# Patient Record
Sex: FEMALE | Race: OTHER | HISPANIC OR LATINO
[De-identification: names, ages, dates, MRNs, and addresses within clinical notes are randomized per-mention and may not be internally consistent; named-entity substitution may affect disease eponyms.]

---

## 2017-12-22 PROBLEM — Z00.00 ENCOUNTER FOR PREVENTIVE HEALTH EXAMINATION: Status: ACTIVE | Noted: 2017-12-22

## 2018-01-11 ENCOUNTER — APPOINTMENT (OUTPATIENT)
Dept: BREAST CENTER | Facility: CLINIC | Age: 28
End: 2018-01-11
Payer: COMMERCIAL

## 2018-01-16 ENCOUNTER — APPOINTMENT (OUTPATIENT)
Dept: BREAST CENTER | Facility: CLINIC | Age: 28
End: 2018-01-16
Payer: COMMERCIAL

## 2018-01-16 VITALS
WEIGHT: 214 LBS | BODY MASS INDEX: 35.65 KG/M2 | SYSTOLIC BLOOD PRESSURE: 116 MMHG | DIASTOLIC BLOOD PRESSURE: 80 MMHG | HEIGHT: 65 IN

## 2018-01-16 DIAGNOSIS — Z78.9 OTHER SPECIFIED HEALTH STATUS: ICD-10-CM

## 2018-01-16 PROCEDURE — 99212 OFFICE O/P EST SF 10 MIN: CPT

## 2019-01-15 NOTE — HISTORY OF PRESENT ILLNESS
[FreeTextEntry1] : Patient with h/o Left nipple bx 8/26/09; psoriasiform changes of epidermis.\par h/o Right papilloma with sclerosis on NC 12/14/11; 9:00 Retroareolar.\par s/p Right NLOC 1/30/12 - intraductal papilloma, FC changes, ductal hyperplasia, apocrine metaplasia, adenosis and stromal fibrosis.\par Family history of breast cancer - mother 44 and paternal grandmother.  Mother is BRCA (-).

## 2019-01-15 NOTE — PAST MEDICAL HISTORY
[Menstruating] : The patient is menstruating [Menarche Age ____] : age at menarche was [unfilled] [Regular Cycle Intervals] : have been regular [FreeTextEntry5] : ovarian cystectomy with torsion and cysectomy with appendectomy.  [FreeTextEntry2] : no pregnancies.   [FreeTextEntry6] : none [FreeTextEntry7] : OCP 15 to 25 years old. [FreeTextEntry8] : none

## 2019-01-22 ENCOUNTER — APPOINTMENT (OUTPATIENT)
Dept: BREAST CENTER | Facility: CLINIC | Age: 29
End: 2019-01-22

## 2021-04-27 PROBLEM — Z80.3 FAMILY HISTORY OF MALIGNANT NEOPLASM OF BREAST: Status: ACTIVE | Noted: 2018-01-16

## 2021-04-27 PROBLEM — Z78.9 USES ALCOHOL OCCASIONALLY: Status: ACTIVE | Noted: 2021-04-27

## 2021-04-27 PROBLEM — Z87.09 HISTORY OF ASTHMA: Status: RESOLVED | Noted: 2021-04-27 | Resolved: 2021-04-27

## 2021-04-27 PROBLEM — Z86.2 HISTORY OF ANEMIA: Status: RESOLVED | Noted: 2021-04-27 | Resolved: 2021-04-27

## 2021-04-27 PROBLEM — Z87.891 FORMER SMOKER: Status: ACTIVE | Noted: 2018-01-16

## 2021-04-28 ENCOUNTER — APPOINTMENT (OUTPATIENT)
Dept: BREAST CENTER | Facility: CLINIC | Age: 31
End: 2021-04-28
Payer: COMMERCIAL

## 2021-04-28 VITALS
HEIGHT: 65 IN | DIASTOLIC BLOOD PRESSURE: 84 MMHG | SYSTOLIC BLOOD PRESSURE: 122 MMHG | TEMPERATURE: 98.5 F | BODY MASS INDEX: 31.32 KG/M2 | WEIGHT: 188 LBS

## 2021-04-28 DIAGNOSIS — Z78.9 OTHER SPECIFIED HEALTH STATUS: ICD-10-CM

## 2021-04-28 DIAGNOSIS — L30.9 DERMATITIS, UNSPECIFIED: ICD-10-CM

## 2021-04-28 DIAGNOSIS — Z87.891 PERSONAL HISTORY OF NICOTINE DEPENDENCE: ICD-10-CM

## 2021-04-28 DIAGNOSIS — Z87.09 PERSONAL HISTORY OF OTHER DISEASES OF THE RESPIRATORY SYSTEM: ICD-10-CM

## 2021-04-28 DIAGNOSIS — Z80.3 FAMILY HISTORY OF MALIGNANT NEOPLASM OF BREAST: ICD-10-CM

## 2021-04-28 DIAGNOSIS — Z86.2 PERSONAL HISTORY OF DISEASES OF THE BLOOD AND BLOOD-FORMING ORGANS AND CERTAIN DISORDERS INVOLVING THE IMMUNE MECHANISM: ICD-10-CM

## 2021-04-28 PROCEDURE — 99072 ADDL SUPL MATRL&STAF TM PHE: CPT

## 2021-04-28 PROCEDURE — 99203 OFFICE O/P NEW LOW 30 MIN: CPT

## 2021-04-28 NOTE — REVIEW OF SYSTEMS
[Negative] : Heme/Lymph [As Noted in HPI] : as noted in HPI [Skin Lesions] : skin lesion [Skin Wound] : no skin wound [Itching] : no itching [Change In A Mole] : no change in a mole [Breast Pain] : no breast pain [Breast Lump] : no breast lump

## 2021-04-28 NOTE — HISTORY OF PRESENT ILLNESS
[FreeTextEntry1] : Kimberly is a 30 F with benign high risk disease, and a history of R lumpectomy for an intraductal papilloma. \par \par She has not had any recent imaging. \par \par She underwent a R lumpectomy on 1/30/2012 for an intraductal papilloma. \par She has no breast related complaints at this time.  She denies any breast pain, has not palpated any new palpable masses in either breast and denies any nipple discharge or retraction. \par \par However, she does have a history of R nipple dermatitis which was previously treated with creams with little relief.  She did see a dermatologist also who prescribed her with some creams, however she reports that it does not completely resolve.  \par \par HISTORICAL RISK FACTORS: \par -prior breast biopsies/surgeries as above\par -family history of breast cancer in her mother at age 45, paternal grandmother in her 60s\par -G0\par -OCP use currently, used intermittently for the past 15 years for PCOS \par -no gyn surgeries\par \par RISK ASSESSMENT: \par Al \par 5yr -- 0.5%\par lifetime -- 13.5%\par \par TC v6\par 5yr -- 0.5%\par lifetime -- 30.3%

## 2021-04-28 NOTE — ASSESSMENT
[FreeTextEntry1] : Kimberly is a 30 F with benign high risk disease 2/2 a family history and a personal history of R lumpectomy for an intraductal papilloma. \par \par ON exam, I was able to palpate nonspecific nodularities in her left superior breast which feels like scar tissue, but otherwise no other suspicious lesions were palpated in either breast. \par \par She has not had any recent imaging. \par \par In regards to her family history of breast cancer in her mother and paternal grandmother, her risk assessment is as follows: \par RISK ASSESSMENT: \par Al \par 5yr -- 0.5%\par lifetime -- 13.5%\par \par TC v6\par 5yr -- 0.5%\par lifetime -- 30.3%\par \par This puts her in the high risk category for breast cancer because she has a lifetime risk of >20%.  She qualifies for annual screening MRIs which would be done in an alternating fashion with her screening mammograms such that an imaging study and clinical breast exam would be performed every 6 months.  The use of MRIs have not been shown to prolong survival, however out of 1000 women screened, an additional 14-15 cancers will be identified.  The use of MRIs, has, however, been shown to increase the number of procedures and additional imaging because although it is a very sensitive test, it is not as specific.  Because her mother was diagnosed at age 45, I recommend starting screening breast MRIs at the age of 35. \par \par SHe should also start annual screening mammograms at the age of 36. \par \par Her 5yr risk does not exceed 1.7% so she does not quite meet criteria for risk reducing medications at this time. \par \par All of her questions were answered.  She knows to call with any further questions or concerns. \par \par PLAN: \par -f/up in 6 months for re-evaluation of nipple dermatitis \par -hydrocortisone cream/ointment prescribed

## 2021-04-28 NOTE — PAST MEDICAL HISTORY
[Menstruating] : The patient is menstruating [Menarche Age ____] : age at menarche was [unfilled] [Definite ___ (Date)] : the last menstrual period was [unfilled] [Total Preg ___] : G[unfilled] [History of Hormone Replacement Treatment] : has no history of hormone replacement treatment [FreeTextEntry5] : denies  [FreeTextEntry6] : denies [FreeTextEntry7] : ?  [FreeTextEntry8] : n/a

## 2021-04-28 NOTE — PHYSICAL EXAM
[Normocephalic] : normocephalic [Atraumatic] : atraumatic [EOMI] : extra ocular movement intact [No Supraclavicular Adenopathy] : no supraclavicular adenopathy [No Cervical Adenopathy] : no cervical adenopathy [Examined in the supine and seated position] : examined in the supine and seated position [Symmetrical] : symmetrical [No dominant masses] : no dominant masses in right breast  [No dominant masses] : no dominant masses left breast [No Nipple Retraction] : no left nipple retraction [No Nipple Discharge] : no left nipple discharge [No Axillary Lymphadenopathy] : no left axillary lymphadenopathy [Soft] : abdomen soft [Not Tender] : non-tender [No Edema] : no edema [No Rashes] : no rashes [No Ulceration] : no ulceration [de-identified] : surgical incisions are healing well , no suspicious abnormalities were palpated within either breast  [de-identified] : no evidence of dermatitis at this time  [de-identified] : nodularity palpated @12:00N1, feels like post surgical changes from her b/l mammaplasty

## 2021-07-06 ENCOUNTER — APPOINTMENT (OUTPATIENT)
Dept: BREAST CENTER | Facility: CLINIC | Age: 31
End: 2021-07-06
Payer: COMMERCIAL

## 2021-07-06 DIAGNOSIS — N63.10 UNSPECIFIED LUMP IN THE RIGHT BREAST, UNSPECIFIED QUADRANT: ICD-10-CM

## 2021-07-06 PROCEDURE — 99072 ADDL SUPL MATRL&STAF TM PHE: CPT

## 2021-07-06 PROCEDURE — 99213 OFFICE O/P EST LOW 20 MIN: CPT

## 2021-07-08 NOTE — REVIEW OF SYSTEMS
[As Noted in HPI] : as noted in HPI [Negative] : Heme/Lymph [Breast Lump] : breast lump [Skin Lesions] : no skin lesions [Skin Wound] : no skin wound [Itching] : no itching [Change In A Mole] : no change in a mole [Breast Pain] : no breast pain

## 2021-07-08 NOTE — HISTORY OF PRESENT ILLNESS
[FreeTextEntry1] : Kimberly is a 30 F with benign high risk disease, and a history of R lumpectomy for an intraductal papilloma. \par \par She has not had any recent imaging. \par \par She underwent a R lumpectomy on 1/30/2012 for an intraductal papilloma. \par She has no breast related complaints at this time.  She denies any breast pain, has not palpated any new palpable masses in either breast and denies any nipple discharge or retraction. \par \par However, she does have a history of R nipple dermatitis which was previously treated with creams with little relief.  She did see a dermatologist also who prescribed her with some creams, however she reports that it does not completely resolve.  \par \par HISTORICAL RISK FACTORS: \par -prior breast biopsies/surgeries as above\par -family history of breast cancer in her mother at age 45, paternal grandmother in her 60s\par -G0\par -OCP use currently, used intermittently for the past 15 years for PCOS \par -no gyn surgeries\par \par RISK ASSESSMENT: \par Al \par 5yr -- 0.5%\par lifetime -- 13.5%\par \par TC v6\par 5yr -- 0.5%\par lifetime -- 30.3%\par \par INTERVAL HISTORY: \par Kimberly returns for a short term follow up because she felt a new right breast mass.  \par \par Starting about 1 week prior, she noticed a right breast mass in the inferior portion of her breast.  IT does not cause her pain, and has not changed in size or appearance since she first felt it.  She denies any other breast related complaints and has not had any issues with dermatitis since her last visit. \par \par She has not had any recent imaging.

## 2021-07-08 NOTE — PHYSICAL EXAM
[Normocephalic] : normocephalic [Atraumatic] : atraumatic [EOMI] : extra ocular movement intact [No Supraclavicular Adenopathy] : no supraclavicular adenopathy [No Cervical Adenopathy] : no cervical adenopathy [Examined in the supine and seated position] : examined in the supine and seated position [No dominant masses] : no dominant masses in right breast  [Symmetrical] : symmetrical [No dominant masses] : no dominant masses left breast [No Nipple Retraction] : no left nipple retraction [No Nipple Discharge] : no left nipple discharge [No Axillary Lymphadenopathy] : no left axillary lymphadenopathy [Soft] : abdomen soft [Not Tender] : non-tender [No Edema] : no edema [No Rashes] : no rashes [No Ulceration] : no ulceration [de-identified] : surgical incisions are well healed, no suspicious abnormalities were palpated within her left breast  [de-identified] : no evidence of dermatitis at this time, just to the left of her inframammary fold in the center, she has a 1 cm nonmobile nodule without any overlying skin changes  [de-identified] : nodularity palpated @12:00N1, feels like post surgical changes from her b/l mammaplasty

## 2021-07-08 NOTE — ASSESSMENT
[FreeTextEntry1] : Kimberly is a 30 F with benign high risk disease 2/2 a family history and a personal history of R lumpectomy for an intraductal papilloma, now with a palpable right breast mass. \par \par ON exam, I was able to palpate nonspecific nodularities in her left superior breast which feels like scar tissue.  In her right breast, no evidence of dermatitis at this time, just to the left of her inframammary fold in the center, she has a 1 cm nonmobile nodule without any overlying skin changes. \par \par In light of these findings, I will have her scheduled for a R breast US.  She should follow up after her repeat imaging. \par \par In regards to her family history of breast cancer in her mother and paternal grandmother, her risk assessment is as follows: \par RISK ASSESSMENT: \par Al \par 5yr -- 0.5%\par lifetime -- 13.5%\par \par TC v6\par 5yr -- 0.5%\par lifetime -- 30.3%\par \par This puts her in the high risk category for breast cancer because she has a lifetime risk of >20%.  She qualifies for annual screening MRIs which would be done in an alternating fashion with her screening mammograms such that an imaging study and clinical breast exam would be performed every 6 months.  The use of MRIs have not been shown to prolong survival, however out of 1000 women screened, an additional 14-15 cancers will be identified.  The use of MRIs, has, however, been shown to increase the number of procedures and additional imaging because although it is a very sensitive test, it is not as specific.  Because her mother was diagnosed at age 45, I recommend starting screening breast MRIs at the age of 35. \par \par SHe should also start annual screening mammograms at the age of 36. \par \par Her 5yr risk does not exceed 1.7% so she does not quite meet criteria for risk reducing medications at this time. \par \par All of her questions were answered.  She knows to call with any further questions or concerns. \par \par PLAN: \par -R breast US now \par -f/up after

## 2021-07-14 ENCOUNTER — RESULT REVIEW (OUTPATIENT)
Age: 31
End: 2021-07-14

## 2021-07-14 ENCOUNTER — OUTPATIENT (OUTPATIENT)
Dept: OUTPATIENT SERVICES | Facility: HOSPITAL | Age: 31
LOS: 1 days | Discharge: HOME | End: 2021-07-14
Payer: COMMERCIAL

## 2021-07-14 ENCOUNTER — NON-APPOINTMENT (OUTPATIENT)
Age: 31
End: 2021-07-14

## 2021-07-14 DIAGNOSIS — N63.10 UNSPECIFIED LUMP IN THE RIGHT BREAST, UNSPECIFIED QUADRANT: ICD-10-CM

## 2021-07-14 PROCEDURE — 76642 ULTRASOUND BREAST LIMITED: CPT | Mod: 26,RT

## 2021-07-29 ENCOUNTER — RESULT REVIEW (OUTPATIENT)
Age: 31
End: 2021-07-29

## 2021-07-29 ENCOUNTER — OUTPATIENT (OUTPATIENT)
Dept: OUTPATIENT SERVICES | Facility: HOSPITAL | Age: 31
LOS: 1 days | Discharge: HOME | End: 2021-07-29
Payer: COMMERCIAL

## 2021-07-29 PROCEDURE — 19083 BX BREAST 1ST LESION US IMAG: CPT | Mod: RT

## 2021-07-29 PROCEDURE — 88305 TISSUE EXAM BY PATHOLOGIST: CPT | Mod: 26

## 2021-07-30 LAB — SURGICAL PATHOLOGY STUDY: SIGNIFICANT CHANGE UP

## 2021-08-02 DIAGNOSIS — N63.10 UNSPECIFIED LUMP IN THE RIGHT BREAST, UNSPECIFIED QUADRANT: ICD-10-CM

## 2021-08-05 ENCOUNTER — NON-APPOINTMENT (OUTPATIENT)
Age: 31
End: 2021-08-05

## 2021-08-30 ENCOUNTER — APPOINTMENT (OUTPATIENT)
Dept: BREAST CENTER | Facility: CLINIC | Age: 31
End: 2021-08-30

## 2021-09-10 ENCOUNTER — TRANSCRIPTION ENCOUNTER (OUTPATIENT)
Age: 31
End: 2021-09-10

## 2021-10-11 NOTE — PAST MEDICAL HISTORY
[History of Hormone Replacement Treatment] : has no history of hormone replacement treatment [FreeTextEntry5] : denies  [FreeTextEntry6] : denies [FreeTextEntry7] : ?  [FreeTextEntry8] : n/a

## 2021-10-11 NOTE — DATA REVIEWED
[FreeTextEntry1] : EXAM:  MG US BREAST LIMITED RT\par \par \par PROCEDURE DATE:  07/14/2021\par \par \par \par INTERPRETATION:  PROCEDURE:  US BREAST LIMITED RIGHT\par \par INDICATION:  RIGHT BREAST PALPABLE LUMP.\par \par TECHNIQUE: Grayscale and color Doppler ultrasound of the right breast dated 7/14/2021 2:00 PM were compared with the previous dating back to 2011.\par \par FINDINGS:\par \par Right Breast:\par \par * Finding 1:  6:00 axis, 8 cm from the nipple, heterogeneous hypoechoic mass measuring 17 x 13 x 22 mm.\par \par * Other: No abnormal masses within the right breast.\par \par IMPRESSION:\par \par 1.  Heterogeneous hypoechoic mass at the 6:00 axis, 8 cm from the nipple. Recommend ultrasound-guided biopsy.\par \par BI-RADS Category 4: Suspicious\par \par --- End of Report ---\par RIGHT BREAST, CORE BIOPSY (FOR MASS):\par - BREAST TISSUE SHOWING FAT NECROSIS WITH ASSOCIATED REACTIVE\par INFLAMMATION.\par

## 2021-10-11 NOTE — HISTORY OF PRESENT ILLNESS
[FreeTextEntry1] : Kimberly is a 30 F with benign high risk disease, and a history of R lumpectomy for an intraductal papilloma. \par \par She has not had any recent imaging. \par \par She underwent a R lumpectomy on 1/30/2012 for an intraductal papilloma. \par She has no breast related complaints at this time.  She denies any breast pain, has not palpated any new palpable masses in either breast and denies any nipple discharge or retraction. \par \par However, she does have a history of R nipple dermatitis which was previously treated with creams with little relief.  She did see a dermatologist also who prescribed her with some creams, however she reports that it does not completely resolve.  \par \par HISTORICAL RISK FACTORS: \par -prior breast biopsies/surgeries as above\par -family history of breast cancer in her mother at age 45, paternal grandmother in her 60s\par -G0\par -OCP use currently, used intermittently for the past 15 years for PCOS \par -no gyn surgeries\par \par RISK ASSESSMENT: \par Al \par 5yr -- 0.5%\par lifetime -- 13.5%\par \par TC v6\par 5yr -- 0.5%\par lifetime -- 30.3%\par \par INTERVAL HISTORY: \par Kimberly returns for a short term follow up because she felt a new right breast mass.  \par \par Starting about 1 week prior, she noticed a right breast mass in the inferior portion of her breast.  IT does not cause her pain, and has not changed in size or appearance since she first felt it.  She denies any other breast related complaints and has not had any issues with dermatitis since her last visit. \par \par She has not had any recent imaging. \par \par \par INTERVAL HISTORY: 08/30/21\par Kimberly returns for a short term follow up\par \par Her work up was as follows: \par -07/14/2021- RIGHT U/S \par       -@6N8  heterogeneous hypoechoic mass measuring 17 x 13 x 22 mm.-->ultrasound-guided biopsy\par        Deemed BIRADS4\par -08/04/2021 Rt US guided bx (mini-cork)\par         -- BREAST TISSUE SHOWING FAT NECROSIS WITH ASSOCIATED REACTIVE\par               INFLAMMATION

## 2021-10-11 NOTE — ASSESSMENT
[FreeTextEntry1] : Kimberly is a 30 F with benign high risk disease 2/2 a family history and a personal history of R lumpectomy for an intraductal papilloma, now with a palpable right breast mass. \par \par ON exam, \par  \par \par In regards to her family history of breast cancer in her mother and paternal grandmother, her risk assessment is as follows: \par RISK ASSESSMENT: \par Al \par 5yr -- 0.5%\par lifetime -- 13.5%\par \par TC v6\par 5yr -- 0.5%\par lifetime -- 30.3%\par \par This puts her in the high risk category for breast cancer because she has a lifetime risk of >20%.  She qualifies for annual screening MRIs which would be done in an alternating fashion with her screening mammograms such that an imaging study and clinical breast exam would be performed every 6 months.  The use of MRIs have not been shown to prolong survival, however out of 1000 women screened, an additional 14-15 cancers will be identified.  The use of MRIs, has, however, been shown to increase the number of procedures and additional imaging because although it is a very sensitive test, it is not as specific.  Because her mother was diagnosed at age 45, I recommend starting screening breast MRIs at the age of 36. \par \par SHe should also start annual screening mammograms at the age of 36. \par \par Her 5yr risk does not exceed 1.7% so she does not quite meet criteria for risk reducing medications at this time. \par \par All of her questions were answered.  She knows to call with any further questions or concerns. \par \par PLAN: \par - \par -f/up after

## 2021-10-12 ENCOUNTER — APPOINTMENT (OUTPATIENT)
Dept: BREAST CENTER | Facility: CLINIC | Age: 31
End: 2021-10-12

## 2022-03-05 ENCOUNTER — EMERGENCY (EMERGENCY)
Facility: HOSPITAL | Age: 32
LOS: 0 days | Discharge: HOME | End: 2022-03-05
Attending: EMERGENCY MEDICINE | Admitting: EMERGENCY MEDICINE
Payer: COMMERCIAL

## 2022-03-05 VITALS
HEART RATE: 71 BPM | OXYGEN SATURATION: 100 % | SYSTOLIC BLOOD PRESSURE: 157 MMHG | RESPIRATION RATE: 17 BRPM | DIASTOLIC BLOOD PRESSURE: 99 MMHG | TEMPERATURE: 98 F

## 2022-03-05 DIAGNOSIS — S42.021A DISPLACED FRACTURE OF SHAFT OF RIGHT CLAVICLE, INITIAL ENCOUNTER FOR CLOSED FRACTURE: ICD-10-CM
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
## 2022-03-05 PROCEDURE — 99284 EMERGENCY DEPT VISIT MOD MDM: CPT

## 2022-03-05 NOTE — ED PROVIDER NOTE - CARE PROVIDER_API CALL
Dwight Beaver)  Orthopaedic Surgery  [address] [place] [address]  Phone: [phone]  Fax: [phone]  Follow Up Time: 1-3 Days

## 2022-03-05 NOTE — ED PROVIDER NOTE - PHYSICAL EXAMINATION
Physical Exam    Vital Signs: I have reviewed the initial vital signs.  Constitutional: well-nourished, appears stated age, no acute distress  Eyes: Conjunctiva pink, Sclera clear, PERRLA, EOMI without pain. :  Cardiovascular: S1 and S2, regular rate, regular rhythm, well-perfused extremities, radial pulses equal and 2+ b/l.   Respiratory: unlabored respiratory effort, clear to auscultation bilaterally no wheezing, rales and rhonchi. pt is speaking full sentences. no accessory muscle use. (+) no visible clavicular deformity. (+) mild tenderness to the distal clavicle. No flail chest.   Musculoskeletal: FROM of b/l upper and lower extremities however pain with moving the right upper extremity.   Integumentary: warm, dry, no rash  Neurologic: awake, alert, cranial nerves II-XII grossly intact, extremities’ motor and sensory functions grossly intact. median, radial, and ulnar nerve motor and sensory function grossly intact b/l. sensation over the deltoids intact b/l. steady gait.   Psychiatric: appropriate mood, appropriate affect

## 2022-03-05 NOTE — ED PROVIDER NOTE - NSFOLLOWUPINSTRUCTIONS_ED_ALL_ED_FT
Clavicle Fracture    A clavicle fracture is a break in the long bone that connects the shoulder to the chest wall (clavicle). The clavicle is also called the collarbone. A clavicle fracture is a common injury that can happen at any age.    What are the causes?  Common causes of a clavicle fracture include:  A hard, direct hit (blow) to the shoulder.  A car accident.  A fall, especially if you try to break your fall with an outstretched arm.  What increases the risk?  You are more likely to develop this condition if:  You are younger than 25 or older than 75. Most clavicle fractures happen to people who are younger than 25.  You are a male.  You play contact sports.  What are the signs or symptoms?  Symptoms of this condition include:  Pain.  Difficulty moving the arm.  A shoulder that drops downward and forward.  Pain when trying to lift the shoulder.  Bruising, swelling, and tenderness over the clavicle.  A grinding noise when you try to move the shoulder.  A bump over the clavicle.  How is this diagnosed?  This condition is diagnosed based on:  Your medical history.  A physical exam.  X-rays to determine the position of your clavicle.    How is this treated?  Treatment for this condition depends on the position of your clavicle after the fracture:  If the broken ends of the bone are not out of place, your health care provider may put your arm in a sling.  If the broken ends of the bone are out of place, you may need surgery. Surgery may involve placing screws, pins, or plates to keep your clavicle stable while it heals.  When your health care provider thinks your fracture has healed enough, you may have to do physical therapy to regain normal movement and build up your arm strength.    Follow these instructions at home:  If you have a sling:   Wear the sling as told by your health care provider. Remove it only as told by your health care provider.   Loosen the sling if your fingers tingle, become numb, or turn cold and blue.  Do not lift your arm. Keep it across your chest.  Keep the sling clean.  Ask your health care provider if you may remove the sling for bathing.  If your sling is not waterproof, do not let it get wet. Cover the sling with a watertight covering if you take a bath or a shower while wearing it.  If you may remove your sling when you take a bath or a shower, keep your shoulder in the same position as when the sling is on.    Managing pain, stiffness, and swelling   If directed, put ice on the injured area:  If you have a removable sling, remove it as told by your health care provider.  Put ice in a plastic bag.  Place a towel between your skin and the bag.  Leave the ice on for 20 minutes, 2–3 times a day.  Activity     Avoid activities that make your symptoms worse for 4–6 weeks, or as long as directed.  Ask your health care provider when it is safe for you to drive.  Do exercises as told by your health care provider.  General instructions     Do not use any products that contain nicotine or tobacco, such as cigarettes and e-cigarettes. These can delay bone healing. If you need help quitting, ask your health care provider.  Take over-the-counter and prescription medicines only as told by your health care provider.  Keep all follow-up visits as told by your health care provider. This is important.  Contact a health care provider if:  Your medicine is not helping to relieve pain and swelling.  Get help right away if:  Your arm is numb, cold, or pale, even when your splint is loose.    Summary  The clavicle, also called the collarbone, is the long bone that connects the shoulder to the chest wall.  Treatment for this condition depends on the position of your clavicle after the fracture.   You may need to do physical therapy after your injury has healed enough.  If you have a sling, wear the sling as told by your health care provider.  This information is not intended to replace advice given to you by your health care provider. Make sure you discuss any questions you have with your health care provider.

## 2022-03-05 NOTE — ED PROVIDER NOTE - ATTENDING CONTRIBUTION TO CARE
32 yo [place] with no pmh [place] [facility] [place].  pt says she fell onto her shoulder 30 yo f with no pmh [place] [facility] [place].  pt says she fell onto her shoulder after tripping over a cat.  pt went to [facility] and had xray and told had clavicle fx and placed in a sling.  had some paresthesias prior to sling, but now improved.  pt denies cp or sob.  exam: nad, ncat, perrl, eomi, mmm, rrr, ctab, abd soft, nt, nd aox3, ttp over R clavicle, no skin tenting, minimal ecchymosis imp: pt with R distal 3rd clavicular fx, already in sling, appears well, f/u with ortho outpt

## 2022-03-05 NOTE — ED PROVIDER NOTE - PATIENT PORTAL LINK FT
You can access the FollowMyHealth Patient Portal offered by [facility] by registering at the following website: [email or web address]. By joining [facility]’s FollowMyHealth portal, you will also be able to view your health information using other applications (apps) compatible with our system.

## 2022-03-05 NOTE — ED PROVIDER NOTE - PROGRESS NOTE DETAILS
FF: [facility] advised of return precautions discussed at bedside. agreeable to dc. f/u with ortho.

## 2022-03-05 NOTE — ED PROVIDER NOTE - CARE PROVIDERS DIRECT ADDRESSES
[email or web address]

## 2022-03-05 NOTE — ED PROVIDER NOTE - NS ED ROS FT
CONST: No fever, chills or bodyaches  EYES: No pain, redness, drainage or visual changes.  ENT: No ear pain or discharge, nasal discharge or congestion. No sore throat  CARD: No chest pain, palpitations  RESP: No SOB, cough, hemoptysis. No hx of asthma or COPD  GI: No abdominal pain, N/V/D  : No urinary symptoms  MS: (+) right distal clavicle pain. No joint pain, back pain or extremity pain/injury  SKIN: No rashes  NEURO: No headache, dizziness, paresthesias or LOC

## 2022-03-05 NOTE — ED PROVIDER NOTE - OBJECTIVE STATEMENT
30 y/o female with no significant PMH presents to the ED sent in by [facility] for evaluation of right clavicular fracture. pt reports she tripped and fell over her friends cat yesterday landing on his right side. pt placed in a sling. [facility] advised pt to visit ED for potential puncture lung or nerve damage? pt denies weakness, numbness, tingling, sob, or chest pain.

## 2022-03-05 NOTE — ED ADULT TRIAGE NOTE - CHIEF COMPLAINT QUOTE
pt [place] [facility] [place] a [place], pt states "doc is concerned I can puncture my lung" full rom ,denies numbness/tingling.

## 2022-03-05 NOTE — ED ADULT NURSE NOTE - CHIEF COMPLAINT QUOTE
[place] [facility] [place] a [place], pt states "doc is concerned I can puncture my lung" full rom ,denies numbness/tingling.

## 2022-06-10 ENCOUNTER — OUTPATIENT (OUTPATIENT)
Dept: OUTPATIENT SERVICES | Facility: HOSPITAL | Age: 32
LOS: 1 days | Discharge: HOME | End: 2022-06-10
Payer: COMMERCIAL

## 2022-06-10 DIAGNOSIS — N97.8 FEMALE INFERTILITY OF OTHER ORIGIN: ICD-10-CM

## 2022-06-10 PROCEDURE — 58340 CATHETER FOR HYSTEROGRAPHY: CPT

## 2022-06-10 PROCEDURE — 74740 X-RAY FEMALE GENITAL TRACT: CPT | Mod: 26

## 2022-08-02 ENCOUNTER — APPOINTMENT (OUTPATIENT)
Dept: ORTHOPEDIC SURGERY | Facility: CLINIC | Age: 32
End: 2022-08-02

## 2022-08-02 DIAGNOSIS — S42.031D DISPLACED FRACTURE OF LATERAL END OF RIGHT CLAVICLE, SUBSEQUENT ENCOUNTER FOR FRACTURE WITH ROUTINE HEALING: ICD-10-CM

## 2022-08-02 PROCEDURE — 99213 OFFICE O/P EST LOW 20 MIN: CPT

## 2022-08-02 PROCEDURE — 73000 X-RAY EXAM OF COLLAR BONE: CPT | Mod: RT

## 2022-08-02 RX ORDER — SERTRALINE HYDROCHLORIDE 50 MG/1
50 TABLET, FILM COATED ORAL
Qty: 90 | Refills: 0 | Status: ACTIVE | COMMUNITY
Start: 2022-07-12

## 2022-08-02 RX ORDER — CHLORHEXIDINE GLUCONATE 4 %
325 (65 FE) LIQUID (ML) TOPICAL
Qty: 90 | Refills: 0 | Status: ACTIVE | COMMUNITY
Start: 2022-07-12

## 2022-08-02 NOTE — HISTORY OF PRESENT ILLNESS
[de-identified] : The patient is a 31-year-old female here for subsequent re-evaluation of her clavicle.  She is status post right distal clavicle fracture on 03/05/2022.  She is having some discomfort but not much pain in the shoulder.  It is manageable.  Formal physical therapy finished up in early June 2022.  She is not taking any medicine for her clavicle.

## 2022-08-02 NOTE — DATA REVIEWED
[Right] : of the right [FreeTextEntry1] :  Two views of the right clavicle were obtained:  X-rays show that the fracture is completely healed and in acceptable anatomic alignment.

## 2022-08-02 NOTE — PHYSICAL EXAM
[Right] : right shoulder [] : motor and sensory intact distally [FreeTextEntry3] :   Physical exam of the right clavicle:  Small area of prominence over the fracture site. [de-identified] :   Good strength with rotator cuff resistance testing. [TWNoteComboBox7] : active forward flexion 180 degrees [TWNoteComboBox4] : passive forward flexion 165 degrees [TWNoteComboBox6] : internal rotation L1

## 2022-08-02 NOTE — DISCUSSION/SUMMARY
[de-identified] : I reviewed the x-ray findings with the patient.  She will do home therapy exercises for her shoulder.  I will see her back in 2 months for further evaluation.\par \ronnie Saravia physician:  Dr. Dockery

## 2022-10-10 ENCOUNTER — APPOINTMENT (OUTPATIENT)
Dept: ORTHOPEDIC SURGERY | Facility: CLINIC | Age: 32
End: 2022-10-10

## 2023-02-14 ENCOUNTER — TRANSCRIPTION ENCOUNTER (OUTPATIENT)
Age: 33
End: 2023-02-14

## 2023-02-14 ENCOUNTER — APPOINTMENT (OUTPATIENT)
Dept: BREAST CENTER | Facility: CLINIC | Age: 33
End: 2023-02-14
Payer: COMMERCIAL

## 2023-02-14 VITALS
SYSTOLIC BLOOD PRESSURE: 136 MMHG | WEIGHT: 192 LBS | DIASTOLIC BLOOD PRESSURE: 80 MMHG | BODY MASS INDEX: 31.99 KG/M2 | HEIGHT: 65 IN

## 2023-02-14 DIAGNOSIS — D24.1 BENIGN NEOPLASM OF RIGHT BREAST: ICD-10-CM

## 2023-02-14 DIAGNOSIS — Z12.39 ENCOUNTER FOR OTHER SCREENING FOR MALIGNANT NEOPLASM OF BREAST: ICD-10-CM

## 2023-02-14 PROCEDURE — 99214 OFFICE O/P EST MOD 30 MIN: CPT

## 2023-02-14 NOTE — HISTORY OF PRESENT ILLNESS
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 \par Also has history of R nipple dermatitis which was previously treated with creams with little relief.  She did see a dermatologist also who prescribed her with some creams.\par \par HISTORICAL RISK FACTORS: \par -prior breast biopsies/surgeries as above\par -family history of breast cancer in her mother at age 45, paternal grandmother in her 60s\par -G0\par -OCP use currently, used intermittently for the past 15 years for PCOS \par -no gyn surgeries\par \par RISK ASSESSMENT: \par Al \par 5yr -- 0.5%\par lifetime -- 13.5%\par \par TC v6\par 5yr -- 0.5%\par lifetime -- 30.3%\par \par Her most recent imaging is as follows:\par 7/17/21: R US --> BIRADS 4\par 6N8  heterogeneous hypoechoic mass measuring 17 x 13 x 22 mm.-->ultrasound-guided biopsy\par      \par \par 08/04/2021: R USGB Rt US guided bx (mini-cork)\par Benign breast tissue with fat necrosis and reactive inflammation (minicork) (benign concordant)\par \par Patient states she has intermittent bilateral breast pain. No inciting events and no associated symptoms. She is for egg retrieval.

## 2023-02-14 NOTE — ASSESSMENT
[FreeTextEntry1] : Patient is a 32F with history of right IDP s/p excision in 2012 and family history of breast cancer, for high risk screening. TC LR = 30.3%  She complains of bilateral breast pain. She is also planning on undergoing egg retrieval wishes to obtain clearance.  We discussed that she should obtain diagnostic breast imaging as she has symptoms and to r/o any potential areas in her breasts that need further evaluation prior to her undergoing hormonal therapy for egg retrieval.  We also discussed high risk screening with MRIs. Patient’s lifetime risk assessment as per TC is >20%. She qualifies for annual screening MRIs which would be done in an alternating fashion with her screening mammograms such that an imaging study and clinical breast exam would be performed every 6 months. The use of MRIs have not been shown to prolong survival, however out of 1000 women screened, an additional 14-15 cancers will be identified. The use of MRIs, has, however, been shown to increase the number of procedures and additional imaging because although it is a very sensitive test, it is not as specific. I also discussed that some patients could have an allergic reaction to gadolinium, or affect the kidneys, and gadolinium has been found to be deposited in the brain of patients who undergo many MRIs in their lifetime, although the effects are currently understudy. Patient wishes to undergo MRI now prior to her egg retrieval.  All questions and concerns were answered in detail.  Patient for bilateral mmg/us now.  She is for high risk screening with MRI prior to her egg retrieval.  She is to follow up after imaging, pending any interval changes.  Total time spent on encounter was greater than 30 minutes, which included face to face time with the patient, performing an exam, reviewing previous medical records including imaging/ pathology, documenting in patient record and coordinating care/imaging. Greater than 50% of the encounter was spent on counseling and coordination of her breast issue.

## 2023-02-14 NOTE — PHYSICAL EXAM
[Normocephalic] : normocephalic [EOMI] : extra ocular movement intact [No Supraclavicular Adenopathy] : no supraclavicular adenopathy [No Cervical Adenopathy] : no cervical adenopathy [Examined in the supine and seated position] : examined in the supine and seated position [No dominant masses] : no dominant masses in right breast  [No dominant masses] : no dominant masses left breast [No Nipple Retraction] : no left nipple retraction [No Nipple Discharge] : no left nipple discharge [No Axillary Lymphadenopathy] : no left axillary lymphadenopathy [Soft] : abdomen soft [No Rashes] : no rashes [No Ulceration] : no ulceration [de-identified] : NL respirations

## 2023-02-28 ENCOUNTER — NON-APPOINTMENT (OUTPATIENT)
Age: 33
End: 2023-02-28

## 2023-03-01 ENCOUNTER — NON-APPOINTMENT (OUTPATIENT)
Age: 33
End: 2023-03-01

## 2023-10-18 ENCOUNTER — NON-APPOINTMENT (OUTPATIENT)
Age: 33
End: 2023-10-18

## 2023-10-19 ENCOUNTER — NON-APPOINTMENT (OUTPATIENT)
Age: 33
End: 2023-10-19

## 2023-11-01 ENCOUNTER — APPOINTMENT (OUTPATIENT)
Dept: SURGERY | Facility: CLINIC | Age: 33
End: 2023-11-01
Payer: COMMERCIAL

## 2023-11-01 VITALS
SYSTOLIC BLOOD PRESSURE: 142 MMHG | HEIGHT: 64.75 IN | OXYGEN SATURATION: 98 % | WEIGHT: 202 LBS | BODY MASS INDEX: 34.07 KG/M2 | DIASTOLIC BLOOD PRESSURE: 96 MMHG | TEMPERATURE: 97.1 F | HEART RATE: 83 BPM

## 2023-11-01 PROCEDURE — 99204 OFFICE O/P NEW MOD 45 MIN: CPT

## 2023-11-02 ENCOUNTER — NON-APPOINTMENT (OUTPATIENT)
Age: 33
End: 2023-11-02

## 2023-11-04 RX ORDER — HYDROCORTISONE 25 MG/G
2.5 CREAM TOPICAL 3 TIMES DAILY
Qty: 1 | Refills: 3 | Status: DISCONTINUED | COMMUNITY
Start: 2021-04-28 | End: 2023-11-04

## 2023-11-04 RX ORDER — HYDROCORTISONE 25 MG/G
2.5 OINTMENT TOPICAL TWICE DAILY
Qty: 1 | Refills: 1 | Status: DISCONTINUED | COMMUNITY
Start: 2021-04-28 | End: 2023-11-04

## 2023-11-14 ENCOUNTER — APPOINTMENT (OUTPATIENT)
Dept: SURGERY | Facility: CLINIC | Age: 33
End: 2023-11-14

## 2023-11-20 ENCOUNTER — OUTPATIENT (OUTPATIENT)
Dept: OUTPATIENT SERVICES | Facility: HOSPITAL | Age: 33
LOS: 1 days | End: 2023-11-20
Payer: COMMERCIAL

## 2023-11-20 ENCOUNTER — APPOINTMENT (OUTPATIENT)
Dept: SURGERY | Facility: CLINIC | Age: 33
End: 2023-11-20

## 2023-11-20 DIAGNOSIS — Z90.3 ACQUIRED ABSENCE OF STOMACH [PART OF]: ICD-10-CM

## 2023-11-20 PROCEDURE — 74240 X-RAY XM UPR GI TRC 1CNTRST: CPT | Mod: 26

## 2023-11-20 PROCEDURE — 74240 X-RAY XM UPR GI TRC 1CNTRST: CPT

## 2023-11-21 DIAGNOSIS — Z90.3 ACQUIRED ABSENCE OF STOMACH [PART OF]: ICD-10-CM

## 2023-11-22 ENCOUNTER — NON-APPOINTMENT (OUTPATIENT)
Age: 33
End: 2023-11-22

## 2023-11-27 ENCOUNTER — APPOINTMENT (OUTPATIENT)
Dept: SURGERY | Facility: CLINIC | Age: 33
End: 2023-11-27
Payer: COMMERCIAL

## 2023-11-27 PROCEDURE — 97802 MEDICAL NUTRITION INDIV IN: CPT

## 2023-11-29 VITALS — HEIGHT: 64.75 IN | WEIGHT: 195 LBS | BODY MASS INDEX: 32.89 KG/M2

## 2023-12-06 ENCOUNTER — NON-APPOINTMENT (OUTPATIENT)
Age: 33
End: 2023-12-06

## 2023-12-11 ENCOUNTER — APPOINTMENT (OUTPATIENT)
Dept: SURGERY | Facility: CLINIC | Age: 33
End: 2023-12-11
Payer: COMMERCIAL

## 2023-12-11 PROCEDURE — 97803 MED NUTRITION INDIV SUBSEQ: CPT

## 2023-12-13 ENCOUNTER — APPOINTMENT (OUTPATIENT)
Dept: NEUROPSYCHOLOGY | Facility: CLINIC | Age: 33
End: 2023-12-13

## 2023-12-13 ENCOUNTER — OUTPATIENT (OUTPATIENT)
Dept: OUTPATIENT SERVICES | Facility: HOSPITAL | Age: 33
LOS: 1 days | End: 2023-12-13
Payer: COMMERCIAL

## 2023-12-13 VITALS — HEIGHT: 64.75 IN | BODY MASS INDEX: 33.22 KG/M2 | WEIGHT: 197 LBS

## 2023-12-13 DIAGNOSIS — F50.9 EATING DISORDER, UNSPECIFIED: ICD-10-CM

## 2023-12-13 PROCEDURE — 96146 PSYCL/NRPSYC TST AUTO RESULT: CPT | Mod: 95

## 2023-12-14 DIAGNOSIS — F50.9 EATING DISORDER, UNSPECIFIED: ICD-10-CM

## 2024-01-16 ENCOUNTER — APPOINTMENT (OUTPATIENT)
Dept: SURGERY | Facility: CLINIC | Age: 34
End: 2024-01-16
Payer: COMMERCIAL

## 2024-01-16 PROCEDURE — 97803 MED NUTRITION INDIV SUBSEQ: CPT

## 2024-01-17 VITALS — HEIGHT: 64.75 IN | WEIGHT: 195 LBS | BODY MASS INDEX: 32.89 KG/M2

## 2024-01-19 ENCOUNTER — OUTPATIENT (OUTPATIENT)
Dept: OUTPATIENT SERVICES | Facility: HOSPITAL | Age: 34
LOS: 1 days | End: 2024-01-19
Payer: COMMERCIAL

## 2024-01-19 ENCOUNTER — APPOINTMENT (OUTPATIENT)
Dept: NEUROPSYCHOLOGY | Facility: HOSPITAL | Age: 34
End: 2024-01-19

## 2024-01-19 DIAGNOSIS — F50.9 EATING DISORDER, UNSPECIFIED: ICD-10-CM

## 2024-01-19 PROCEDURE — 96138 PSYCL/NRPSYC TECH 1ST: CPT | Mod: 95

## 2024-01-19 PROCEDURE — 96130 PSYCL TST EVAL PHYS/QHP 1ST: CPT | Mod: 95

## 2024-01-19 PROCEDURE — 96139 PSYCL/NRPSYC TST TECH EA: CPT | Mod: 95

## 2024-01-19 PROCEDURE — 90791 PSYCH DIAGNOSTIC EVALUATION: CPT | Mod: 95

## 2024-01-20 DIAGNOSIS — F50.9 EATING DISORDER, UNSPECIFIED: ICD-10-CM

## 2024-01-23 ENCOUNTER — APPOINTMENT (OUTPATIENT)
Dept: OBGYN | Facility: CLINIC | Age: 34
End: 2024-01-23
Payer: COMMERCIAL

## 2024-01-23 ENCOUNTER — APPOINTMENT (OUTPATIENT)
Dept: OBGYN | Facility: CLINIC | Age: 34
End: 2024-01-23

## 2024-01-23 DIAGNOSIS — N76.0 ACUTE VAGINITIS: ICD-10-CM

## 2024-01-23 PROCEDURE — 99203 OFFICE O/P NEW LOW 30 MIN: CPT

## 2024-01-23 NOTE — PHYSICAL EXAM
[Appropriately responsive] : appropriately responsive [Alert] : alert [No Acute Distress] : no acute distress [No Lymphadenopathy] : no lymphadenopathy [Regular Rate Rhythm] : regular rate rhythm [No Murmurs] : no murmurs [Clear to Auscultation B/L] : clear to auscultation bilaterally [Soft] : soft [Non-tender] : non-tender [Non-distended] : non-distended [No HSM] : No HSM [No Mass] : no mass [No Lesions] : no lesions [Oriented x3] : oriented x3 [Examination Of The Breasts] : a normal appearance [No Masses] : no breast masses were palpable [Labia Minora] : normal [Labia Majora] : normal [Normal] : normal [Uterine Adnexae] : normal

## 2024-01-25 LAB
BV BACTERIA RRNA VAG QL NAA+PROBE: DETECTED
C GLABRATA RNA VAG QL NAA+PROBE: NOT DETECTED
C TRACH RRNA SPEC QL NAA+PROBE: NOT DETECTED
CANDIDA RRNA VAG QL PROBE: NOT DETECTED
N GONORRHOEA RRNA SPEC QL NAA+PROBE: NOT DETECTED
T VAGINALIS RRNA SPEC QL NAA+PROBE: NOT DETECTED

## 2024-03-04 ENCOUNTER — APPOINTMENT (OUTPATIENT)
Dept: SURGERY | Facility: CLINIC | Age: 34
End: 2024-03-04

## 2024-03-07 ENCOUNTER — APPOINTMENT (OUTPATIENT)
Dept: SURGERY | Facility: CLINIC | Age: 34
End: 2024-03-07
Payer: COMMERCIAL

## 2024-03-07 VITALS — HEIGHT: 64.75 IN | BODY MASS INDEX: 33.39 KG/M2 | WEIGHT: 198 LBS

## 2024-03-07 PROCEDURE — 97803 MED NUTRITION INDIV SUBSEQ: CPT | Mod: 95

## 2024-03-28 ENCOUNTER — NON-APPOINTMENT (OUTPATIENT)
Age: 34
End: 2024-03-28

## 2024-04-09 ENCOUNTER — NON-APPOINTMENT (OUTPATIENT)
Age: 34
End: 2024-04-09

## 2024-04-26 ENCOUNTER — NON-APPOINTMENT (OUTPATIENT)
Age: 34
End: 2024-04-26

## 2024-04-30 DIAGNOSIS — G89.18 OTHER ACUTE POSTPROCEDURAL PAIN: ICD-10-CM

## 2024-04-30 RX ORDER — ERGOCALCIFEROL 1.25 MG/1
1.25 MG CAPSULE, LIQUID FILLED ORAL
Qty: 12 | Refills: 0 | Status: COMPLETED | COMMUNITY
Start: 2022-05-22 | End: 2024-04-30

## 2024-04-30 RX ORDER — NAPROXEN 500 MG/1
500 TABLET ORAL
Qty: 60 | Refills: 0 | Status: DISCONTINUED | COMMUNITY
Start: 2022-03-07 | End: 2024-04-30

## 2024-04-30 RX ORDER — LEVOFLOXACIN 500 MG/1
500 TABLET, FILM COATED ORAL
Qty: 7 | Refills: 0 | Status: DISCONTINUED | COMMUNITY
Start: 2022-07-07 | End: 2024-04-30

## 2024-04-30 RX ORDER — UBIDECARENONE/VIT E ACET 100MG-5
25 MCG CAPSULE ORAL DAILY
Refills: 0 | Status: ACTIVE | COMMUNITY

## 2024-04-30 RX ORDER — TERCONAZOLE 4 MG/G
0.4 CREAM VAGINAL
Qty: 45 | Refills: 0 | Status: DISCONTINUED | COMMUNITY
Start: 2022-07-07 | End: 2024-04-30

## 2024-04-30 RX ORDER — NABUMETONE 750 MG/1
750 TABLET, FILM COATED ORAL
Qty: 60 | Refills: 0 | Status: DISCONTINUED | COMMUNITY
Start: 2022-03-22 | End: 2024-04-30

## 2024-04-30 RX ORDER — METRONIDAZOLE 7.5 MG/G
0.75 GEL VAGINAL
Qty: 1 | Refills: 1 | Status: DISCONTINUED | COMMUNITY
Start: 2024-01-25 | End: 2024-04-30

## 2024-05-03 ENCOUNTER — APPOINTMENT (OUTPATIENT)
Dept: SURGERY | Facility: CLINIC | Age: 34
End: 2024-05-03
Payer: COMMERCIAL

## 2024-05-03 VITALS
OXYGEN SATURATION: 95 % | WEIGHT: 200 LBS | SYSTOLIC BLOOD PRESSURE: 118 MMHG | HEART RATE: 64 BPM | HEIGHT: 64.75 IN | DIASTOLIC BLOOD PRESSURE: 70 MMHG | BODY MASS INDEX: 33.73 KG/M2

## 2024-05-03 DIAGNOSIS — Z90.3 ACQUIRED ABSENCE OF STOMACH [PART OF]: ICD-10-CM

## 2024-05-03 PROCEDURE — G2211 COMPLEX E/M VISIT ADD ON: CPT | Mod: NC,1L

## 2024-05-03 PROCEDURE — 99215 OFFICE O/P EST HI 40 MIN: CPT

## 2024-05-07 ENCOUNTER — OUTPATIENT (OUTPATIENT)
Dept: OUTPATIENT SERVICES | Facility: HOSPITAL | Age: 34
LOS: 1 days | End: 2024-05-07
Payer: COMMERCIAL

## 2024-05-07 VITALS
TEMPERATURE: 99 F | OXYGEN SATURATION: 99 % | RESPIRATION RATE: 18 BRPM | HEART RATE: 75 BPM | WEIGHT: 199.96 LBS | HEIGHT: 65 IN | SYSTOLIC BLOOD PRESSURE: 135 MMHG | DIASTOLIC BLOOD PRESSURE: 88 MMHG

## 2024-05-07 DIAGNOSIS — Z98.890 OTHER SPECIFIED POSTPROCEDURAL STATES: Chronic | ICD-10-CM

## 2024-05-07 DIAGNOSIS — Z90.3 ACQUIRED ABSENCE OF STOMACH [PART OF]: Chronic | ICD-10-CM

## 2024-05-07 DIAGNOSIS — Z01.818 ENCOUNTER FOR OTHER PREPROCEDURAL EXAMINATION: ICD-10-CM

## 2024-05-07 DIAGNOSIS — E66.01 MORBID (SEVERE) OBESITY DUE TO EXCESS CALORIES: ICD-10-CM

## 2024-05-07 DIAGNOSIS — Z90.49 ACQUIRED ABSENCE OF OTHER SPECIFIED PARTS OF DIGESTIVE TRACT: Chronic | ICD-10-CM

## 2024-05-07 DIAGNOSIS — Z87.42 PERSONAL HISTORY OF OTHER DISEASES OF THE FEMALE GENITAL TRACT: Chronic | ICD-10-CM

## 2024-05-07 LAB
A1C WITH ESTIMATED AVERAGE GLUCOSE RESULT: 4.9 % — SIGNIFICANT CHANGE UP (ref 4–5.6)
ALBUMIN SERPL ELPH-MCNC: 4.3 G/DL — SIGNIFICANT CHANGE UP (ref 3.5–5.2)
ALP SERPL-CCNC: 85 U/L — SIGNIFICANT CHANGE UP (ref 30–115)
ALT FLD-CCNC: 40 U/L — SIGNIFICANT CHANGE UP (ref 0–41)
ANION GAP SERPL CALC-SCNC: 16 MMOL/L — HIGH (ref 7–14)
APTT BLD: 40.6 SEC — HIGH (ref 27–39.2)
AST SERPL-CCNC: 35 U/L — SIGNIFICANT CHANGE UP (ref 0–41)
BASOPHILS # BLD AUTO: 0.02 K/UL — SIGNIFICANT CHANGE UP (ref 0–0.2)
BASOPHILS NFR BLD AUTO: 0.3 % — SIGNIFICANT CHANGE UP (ref 0–1)
BILIRUB SERPL-MCNC: <0.2 MG/DL — SIGNIFICANT CHANGE UP (ref 0.2–1.2)
BLD GP AB SCN SERPL QL: SIGNIFICANT CHANGE UP
BUN SERPL-MCNC: 11 MG/DL — SIGNIFICANT CHANGE UP (ref 10–20)
CALCIUM SERPL-MCNC: 9.3 MG/DL — SIGNIFICANT CHANGE UP (ref 8.4–10.5)
CHLORIDE SERPL-SCNC: 100 MMOL/L — SIGNIFICANT CHANGE UP (ref 98–110)
CO2 SERPL-SCNC: 23 MMOL/L — SIGNIFICANT CHANGE UP (ref 17–32)
CREAT SERPL-MCNC: 0.6 MG/DL — LOW (ref 0.7–1.5)
EGFR: 122 ML/MIN/1.73M2 — SIGNIFICANT CHANGE UP
EOSINOPHIL # BLD AUTO: 0.19 K/UL — SIGNIFICANT CHANGE UP (ref 0–0.7)
EOSINOPHIL NFR BLD AUTO: 2.8 % — SIGNIFICANT CHANGE UP (ref 0–8)
ESTIMATED AVERAGE GLUCOSE: 94 MG/DL — SIGNIFICANT CHANGE UP (ref 68–114)
GLUCOSE SERPL-MCNC: 102 MG/DL — HIGH (ref 70–99)
HCT VFR BLD CALC: 33.5 % — LOW (ref 37–47)
HGB BLD-MCNC: 11.1 G/DL — LOW (ref 12–16)
IMM GRANULOCYTES NFR BLD AUTO: 0.3 % — SIGNIFICANT CHANGE UP (ref 0.1–0.3)
INR BLD: 1.11 RATIO — SIGNIFICANT CHANGE UP (ref 0.65–1.3)
LYMPHOCYTES # BLD AUTO: 1.04 K/UL — LOW (ref 1.2–3.4)
LYMPHOCYTES # BLD AUTO: 15.5 % — LOW (ref 20.5–51.1)
MCHC RBC-ENTMCNC: 26.1 PG — LOW (ref 27–31)
MCHC RBC-ENTMCNC: 33.1 G/DL — SIGNIFICANT CHANGE UP (ref 32–37)
MCV RBC AUTO: 78.6 FL — LOW (ref 81–99)
MONOCYTES # BLD AUTO: 0.51 K/UL — SIGNIFICANT CHANGE UP (ref 0.1–0.6)
MONOCYTES NFR BLD AUTO: 7.6 % — SIGNIFICANT CHANGE UP (ref 1.7–9.3)
NEUTROPHILS # BLD AUTO: 4.93 K/UL — SIGNIFICANT CHANGE UP (ref 1.4–6.5)
NEUTROPHILS NFR BLD AUTO: 73.5 % — SIGNIFICANT CHANGE UP (ref 42.2–75.2)
NRBC # BLD: 0 /100 WBCS — SIGNIFICANT CHANGE UP (ref 0–0)
PLATELET # BLD AUTO: 233 K/UL — SIGNIFICANT CHANGE UP (ref 130–400)
PMV BLD: 11.7 FL — HIGH (ref 7.4–10.4)
POTASSIUM SERPL-MCNC: 4 MMOL/L — SIGNIFICANT CHANGE UP (ref 3.5–5)
POTASSIUM SERPL-SCNC: 4 MMOL/L — SIGNIFICANT CHANGE UP (ref 3.5–5)
PROT SERPL-MCNC: 6.8 G/DL — SIGNIFICANT CHANGE UP (ref 6–8)
PROTHROM AB SERPL-ACNC: 12.7 SEC — SIGNIFICANT CHANGE UP (ref 9.95–12.87)
RBC # BLD: 4.26 M/UL — SIGNIFICANT CHANGE UP (ref 4.2–5.4)
RBC # FLD: 12.3 % — SIGNIFICANT CHANGE UP (ref 11.5–14.5)
SODIUM SERPL-SCNC: 139 MMOL/L — SIGNIFICANT CHANGE UP (ref 135–146)
WBC # BLD: 6.71 K/UL — SIGNIFICANT CHANGE UP (ref 4.8–10.8)
WBC # FLD AUTO: 6.71 K/UL — SIGNIFICANT CHANGE UP (ref 4.8–10.8)

## 2024-05-07 PROCEDURE — 85025 COMPLETE CBC W/AUTO DIFF WBC: CPT

## 2024-05-07 PROCEDURE — 99214 OFFICE O/P EST MOD 30 MIN: CPT | Mod: 25

## 2024-05-07 PROCEDURE — 86850 RBC ANTIBODY SCREEN: CPT

## 2024-05-07 PROCEDURE — 86900 BLOOD TYPING SEROLOGIC ABO: CPT

## 2024-05-07 PROCEDURE — 83036 HEMOGLOBIN GLYCOSYLATED A1C: CPT

## 2024-05-07 PROCEDURE — 86901 BLOOD TYPING SEROLOGIC RH(D): CPT

## 2024-05-07 PROCEDURE — 85610 PROTHROMBIN TIME: CPT

## 2024-05-07 PROCEDURE — 36415 COLL VENOUS BLD VENIPUNCTURE: CPT

## 2024-05-07 PROCEDURE — 80053 COMPREHEN METABOLIC PANEL: CPT

## 2024-05-07 PROCEDURE — 85730 THROMBOPLASTIN TIME PARTIAL: CPT

## 2024-05-07 NOTE — H&P PST ADULT - REASON FOR ADMISSION
Patient is a 33 year old  female presenting to [facility] in preparation for Revisional bariatric surgery laparoscopic gastric bypass, possible open, possible intraoperative endoscopy and all indicated procedure.  on   5/20/24 under general anesthesia by Dr. angulo

## 2024-05-07 NOTE — H&P PST ADULT - HISTORY OF PRESENT ILLNESS
gastric sleeve 2018  suffering from severe gerd  now for planned procedure     PATIENT CURRENTLY DENIES CHEST PAIN  SHORTNESS OF BREATH  PALPITATIONS,  DYSURIA,   + flu 2 weeks ago - completely resolved   denies travel outside the [place] in the past 30 days    Anesthesia Alert  NO--Difficult Airway  NO--History of neck surgery or radiation  NO--Limited ROM of neck  NO--History of Malignant hyperthermia  NO--No personal or family history of Pseudocholinesterase deficiency.  NO--Prior Anesthesia Complication  NO--Latex Allergy  NO--Loose teeth  NO--History of Rheumatoid Arthritis  NO--Bleeding risk  NO--SADIQ  NO--Other_____    PT DENIES ANY RASHES, ABRASION, OR OPEN WOUNDS OR CUTS    AS PER THE PT, THIS IS HIS/HER COMPLETE MEDICAL AND SURGICAL HX, INCLUDING MEDICATIONS PRESCRIBED AND OVER THE COUNTER    Patient verbalized understanding of instructions and was given the opportunity to ask questions and have them answered.    pt denies any suicidal ideation or thoughts, pt states not a threat to self or others      Duke Activity Status Index (DASI)  58.2 points  The higher the score (maximum 58.2), the higher the functional status.  9.89 METs    Revised Cardiac Risk Index for Pre-Operative Risk    Morbid or severe obesity due to excess calories  1 points  Class II Risk  6.0 %  30-day risk of death, MI, or cardiac arrest    Encounter for other preprocedural examination    [id number]; [id number]    Abigail_VstLnk

## 2024-05-07 NOTE — H&P PST ADULT - NSICDXPASTSURGICALHX_GEN_ALL_CORE_FT
PAST SURGICAL HISTORY:  H/O gastric sleeve     H/O liposuction of abdomen     H/O ovarian cystectomy     History of appendectomy     History of breast lift     History of endometriosis     S/P lumpectomy of breast

## 2024-05-07 NOTE — H&P PST ADULT - TEMPERATURE IN FAHRENHEIT (DEGREES F)
Pt seen at bedside, no complains  No nausea, vomiting, fever or chills    Physical Exam:  General: AAOx3, Well developed, NAD  Chest: Normal respiratory effort  Heart: RRR  Abdomen: Soft, NTND, no masses  Neuro/Psych: No localized deficits. Normal speech normal tone  Skin: Normal, no rashes, no lesions noted.   Extremities: Warm, well perfused, no edema, Pulses intact    Vitals:  T(C): 36.5 (04-11 @ 21:54), Max: 36.6 (04-11 @ 10:57)  HR: 61 (04-11 @ 21:54) (61 - 66)  BP: 159/65 (04-11 @ 21:54) (125/64 - 159/65)  RR: 18 (04-11 @ 21:54) (16 - 18)  SpO2: 99% (04-11 @ 21:54) (99% - 99%)      04-11 @ 11:33                    11.1  CBC: 7.56>)-------(<242                     32.8                 - | - | -    CMP:  ----------------------< -               - | - | -                      Ca:-  Phos:-  Mg:-               -|      |-        LFTs:  ------|-|-----             -|      |-      Current Inpatient Medications:  acetaminophen     Tablet .. 650 milliGRAM(s) Oral every 6 hours PRN  heparin   Injectable 5000 Unit(s) SubCutaneous every 12 hours  lactated ringers. 1000 milliLiter(s) (50 mL/Hr) IV Continuous <Continuous>  levothyroxine 100 MICROGram(s) Oral daily  multivitamin 1 Tablet(s) Oral daily  ondansetron Injectable 4 milliGRAM(s) IV Push every 6 hours PRN  phenytoin   Capsule 100 milliGRAM(s) Oral daily  phenytoin   Chewable 50 milliGRAM(s) Chew <User Schedule>         98.9

## 2024-05-08 DIAGNOSIS — Z01.818 ENCOUNTER FOR OTHER PREPROCEDURAL EXAMINATION: ICD-10-CM

## 2024-05-08 DIAGNOSIS — E66.01 MORBID (SEVERE) OBESITY DUE TO EXCESS CALORIES: ICD-10-CM

## 2024-05-08 PROBLEM — Z90.3 STATUS POST SLEEVE GASTRECTOMY: Status: ACTIVE | Noted: 2023-10-31

## 2024-05-08 NOTE — PLAN
[FreeTextEntry1] : At this preoperative visit, we discussed the risks and benefits of weight loss surgery. We specifically discussed the risks of anesthesia including cardiac and pulmonary complications, DVT/PE, pneumonia, leaks, infection, death, bleeding, ulcers, and need for additional operations have been reviewed. We discussed the importance of a consistent diet and exercise regimen in regards to weight loss and maintenance as well. The importance of lifelong mineral and vitamin supplementation was also reviewed with the patient. The patient was given ample opportunity to ask questions and all questions were answered.  They also saw Dennis our nutritionist to discuss the pre- and post-operative instructions and diet in extensive detail. Please see their notes for further detail.

## 2024-05-08 NOTE — HISTORY OF PRESENT ILLNESS
[de-identified] : Ms. BRITO  has completed all of their medical clearances in preparation for bariatric surgery ( a conversion of a sleeve to a bypass for GERD) including psychiatry. The underwent an endoscopy which showed normal sleeve with gastritis and no hernia.. They are here for their pre-operative appointment.

## 2024-05-08 NOTE — PHYSICAL EXAM
[Normal] : affect appropriate [de-identified] : no distress  [de-identified] : nonlabored breathing  [de-identified] : nontender, soft

## 2024-05-08 NOTE — ASSESSMENT
[FreeTextEntry1] : 32 yo female with s/p sleeve gastrectomy with intractable GERD planning for conversion to a gastric bypass

## 2024-05-14 PROBLEM — E66.9 OBESITY, UNSPECIFIED: Chronic | Status: ACTIVE | Noted: 2024-05-07

## 2024-05-14 PROBLEM — Z87.891 PERSONAL HISTORY OF NICOTINE DEPENDENCE: Chronic | Status: ACTIVE | Noted: 2024-05-07

## 2024-05-14 PROBLEM — F41.9 ANXIETY DISORDER, UNSPECIFIED: Chronic | Status: ACTIVE | Noted: 2024-05-07

## 2024-05-17 NOTE — ASU PATIENT PROFILE, ADULT - PATIENT'S GENDER IDENTITY
OVERNIGHT EVENTS: SHARON overnight.   Patient had episode of asymptomatic (was sleeping) hypertension overnight (sBP max 219), pt was evaluate by overnight team. Patient was comfortable, c/o HA which was quickly relieved with Tylenol. Was given HCTZ 12.5mg and labetalol 10mg IVP w/ improvement to sBP 145-160s    SUBJECTIVE / INTERVAL HPI: Patient seen and examined at bedside.   Pt w/o complaints at this time, states he feels better than yesterday. inquiring regarding culture results (still pending). Slept using own CPAP machine (brought from home). Re HTN episode last PM: patient states mildy agitate, had a slight headache, which he attributed to position in bed. Did not have vision changes, N/V, high intensity HA, photophobia, chest pain, palpitations or SOB. After meds given, patient was able to sleep remainder of the night. Did not have a BM since admission, but "feels one progressing" and expects to go this AM.     VITAL SIGNS:  Vital Signs Last 24 Hrs  T(C): 36.8 (21 Dec 2017 05:50), Max: 36.8 (20 Dec 2017 21:15)  T(F): 98.2 (21 Dec 2017 05:50), Max: 98.2 (20 Dec 2017 21:15)  HR: 68 (21 Dec 2017 05:50) (64 - 73)  BP: 145/69 (21 Dec 2017 05:50) (145/69 - 219/104)  BP(mean): --  RR: 16 (21 Dec 2017 05:50) (16 - 20)  SpO2: 98% (21 Dec 2017 05:50) (94% - 98%)    PHYSICAL EXAM:    General: NAD, lying in bed comfortably.   HEENT: NC/AT; PERRL, anicteric sclera; MMM  Neck: supple  Cardiovascular: +S1/S2; RRR  Respiratory: CTA B/L; no W/R/R, good inspiratory effort.   Gastrointestinal: protuberent soft, NT/ND; +BSx4  Extremities: WWP; no edema, clubbing or cyanosis, multipel superficial abrasions of different ages bilaterally L Lower Ext: 3cm well healed, scabbed abrasion without surrounding erythema. non-tender to palpation, no area of induration or discharge present.   Vascular: 2+ radial, DP/PT pulses B/L  Neurological: AAOx3; no focal deficits    MEDICATIONS:  MEDICATIONS  (STANDING):  acyclovir   Tablet 400 milliGRAM(s) Oral two times a day  aspirin enteric coated 81 milliGRAM(s) Oral daily  atorvastatin 40 milliGRAM(s) Oral at bedtime  clopidogrel Tablet 75 milliGRAM(s) Oral daily  dextrose 5%. 1000 milliLiter(s) (50 mL/Hr) IV Continuous <Continuous>  dextrose 50% Injectable 12.5 Gram(s) IV Push once  dextrose 50% Injectable 25 Gram(s) IV Push once  dextrose 50% Injectable 25 Gram(s) IV Push once  DULoxetine 60 milliGRAM(s) Oral daily  finasteride 5 milliGRAM(s) Oral daily  heparin  Injectable 7500 Unit(s) SubCutaneous every 8 hours  insulin lispro (HumaLOG) corrective regimen sliding scale   SubCutaneous Before meals and at bedtime  lisinopril 20 milliGRAM(s) Oral two times a day  metoprolol     tartrate 25 milliGRAM(s) Oral two times a day  modafinil 200 milliGRAM(s) Oral two times a day  oxybutynin 5 milliGRAM(s) Oral two times a day  pantoprazole    Tablet 40 milliGRAM(s) Oral before breakfast  pregabalin 100 milliGRAM(s) Oral daily  pregabalin 200 milliGRAM(s) Oral at bedtime  tamsulosin 0.4 milliGRAM(s) Oral at bedtime  triamterene 37.5 mG/hydrochlorothiazide 25 mG Capsule 1 Capsule(s) Oral daily  vancomycin  IVPB 1500 milliGRAM(s) IV Intermittent every 12 hours    MEDICATIONS  (PRN):  dextrose Gel 1 Dose(s) Oral once PRN Blood Glucose LESS THAN 70 milliGRAM(s)/deciliter  glucagon  Injectable 1 milliGRAM(s) IntraMuscular once PRN Glucose LESS THAN 70 milligrams/deciliter      ALLERGIES:  Allergies    Bactrim (Headache)  Sular (Other)    Intolerances    Cipro (Headache)  vancomycin ([place])      LABS:                        12.4   6.4   )-----------( 170      ( 20 Dec 2017 08:27 )             37.8     12-20    139  |  102  |  18  ----------------------------<  137<H>  3.8   |  23  |  1.06    Ca    8.2<L>      20 Dec 2017 08:27  Mg     2.1     12-20          CAPILLARY BLOOD GLUCOSE      POCT Blood Glucose.: 141 mg/dL (21 Dec 2017 07:46)      RADIOLOGY & ADDITIONAL TESTS: Reviewed. Female

## 2024-05-17 NOTE — ASU PATIENT PROFILE, ADULT - FALL HARM RISK - UNIVERSAL INTERVENTIONS
Bed in lowest position, wheels locked, appropriate side rails in place/Call bell, personal items and telephone in reach/Instruct patient to call for assistance before getting out of bed or chair/Non-slip footwear when patient is out of bed/[place] to call system/Physically safe environment - no spills, clutter or unnecessary equipment/Purposeful Proactive Rounding/Room/bathroom lighting operational, light cord in reach

## 2024-05-19 VITALS
DIASTOLIC BLOOD PRESSURE: 88 MMHG | OXYGEN SATURATION: 99 % | SYSTOLIC BLOOD PRESSURE: 135 MMHG | TEMPERATURE: 99 F | HEART RATE: 75 BPM | HEIGHT: 65 IN | WEIGHT: 199.96 LBS | RESPIRATION RATE: 18 BRPM

## 2024-05-19 RX ORDER — ONDANSETRON 8 MG/1
4 TABLET, FILM COATED ORAL ONCE
Refills: 0 | Status: DISCONTINUED | OUTPATIENT
Start: 2024-05-20 | End: 2024-05-20

## 2024-05-19 RX ORDER — HYDROMORPHONE HYDROCHLORIDE 2 MG/ML
0.5 INJECTION INTRAMUSCULAR; INTRAVENOUS; SUBCUTANEOUS
Refills: 0 | Status: DISCONTINUED | OUTPATIENT
Start: 2024-05-20 | End: 2024-05-20

## 2024-05-19 RX ORDER — SODIUM CHLORIDE 9 MG/ML
1000 INJECTION, SOLUTION INTRAVENOUS
Refills: 0 | Status: DISCONTINUED | OUTPATIENT
Start: 2024-05-20 | End: 2024-05-20

## 2024-05-19 RX ORDER — DEXAMETHASONE 0.5 MG/5ML
8 ELIXIR ORAL ONCE
Refills: 0 | Status: DISCONTINUED | OUTPATIENT
Start: 2024-05-20 | End: 2024-05-20

## 2024-05-19 NOTE — PRE-ANESTHESIA EVALUATION ADULT - NSANTHAPLANRD_GEN_ALL_CORE
[facility]  Medical Office Building        2021    Re: Krystin Thorpe  : 2005    To Whom It Concern:     This is to certify that Krystin Thorpe had an appointment at this office for professional attention on 2021. Please allow above listed patient to take as needed for headache Tylenol 500 mg every six hours.  If you have any further questions or concerns please feel free to call the office at the below listed number.    Thank you,       Austin Patiño MD  [address] [place] [id number] [phone]   general

## 2024-05-19 NOTE — PRE-ANESTHESIA EVALUATION ADULT - NSANTHOSAYNRD_GEN_A_CORE
denies spencer/No. SPENCER screening performed.  STOP BANG Legend: 0-2 = LOW Risk; 3-4 = INTERMEDIATE Risk; 5-8 = HIGH Risk

## 2024-05-20 ENCOUNTER — INPATIENT (INPATIENT)
Facility: HOSPITAL | Age: 34
LOS: 0 days | Discharge: HOME | DRG: 328 | End: 2024-05-21
Attending: STUDENT IN AN ORGANIZED HEALTH CARE EDUCATION/TRAINING PROGRAM | Admitting: STUDENT IN AN ORGANIZED HEALTH CARE EDUCATION/TRAINING PROGRAM
Payer: COMMERCIAL

## 2024-05-20 ENCOUNTER — RESULT REVIEW (OUTPATIENT)
Age: 34
End: 2024-05-20

## 2024-05-20 ENCOUNTER — APPOINTMENT (OUTPATIENT)
Dept: SURGERY | Facility: HOSPITAL | Age: 34
End: 2024-05-20

## 2024-05-20 DIAGNOSIS — Z98.890 OTHER SPECIFIED POSTPROCEDURAL STATES: Chronic | ICD-10-CM

## 2024-05-20 DIAGNOSIS — Z90.3 ACQUIRED ABSENCE OF STOMACH [PART OF]: Chronic | ICD-10-CM

## 2024-05-20 DIAGNOSIS — Z90.49 ACQUIRED ABSENCE OF OTHER SPECIFIED PARTS OF DIGESTIVE TRACT: Chronic | ICD-10-CM

## 2024-05-20 DIAGNOSIS — Z87.42 PERSONAL HISTORY OF OTHER DISEASES OF THE FEMALE GENITAL TRACT: Chronic | ICD-10-CM

## 2024-05-20 DIAGNOSIS — E66.01 MORBID (SEVERE) OBESITY DUE TO EXCESS CALORIES: ICD-10-CM

## 2024-05-20 LAB
ANION GAP SERPL CALC-SCNC: 9 MMOL/L — SIGNIFICANT CHANGE UP (ref 7–14)
BASOPHILS # BLD AUTO: 0 K/UL — SIGNIFICANT CHANGE UP (ref 0–0.2)
BASOPHILS NFR BLD AUTO: 0 % — SIGNIFICANT CHANGE UP (ref 0–1)
BUN SERPL-MCNC: 11 MG/DL — SIGNIFICANT CHANGE UP (ref 10–20)
CALCIUM SERPL-MCNC: 8.9 MG/DL — SIGNIFICANT CHANGE UP (ref 8.4–10.5)
CHLORIDE SERPL-SCNC: 104 MMOL/L — SIGNIFICANT CHANGE UP (ref 98–110)
CO2 SERPL-SCNC: 25 MMOL/L — SIGNIFICANT CHANGE UP (ref 17–32)
CREAT SERPL-MCNC: 0.6 MG/DL — LOW (ref 0.7–1.5)
EGFR: 122 ML/MIN/1.73M2 — SIGNIFICANT CHANGE UP
EOSINOPHIL # BLD AUTO: 0 K/UL — SIGNIFICANT CHANGE UP (ref 0–0.7)
EOSINOPHIL NFR BLD AUTO: 0 % — SIGNIFICANT CHANGE UP (ref 0–8)
GLUCOSE BLDC GLUCOMTR-MCNC: 117 MG/DL — HIGH (ref 70–99)
GLUCOSE BLDC GLUCOMTR-MCNC: 146 MG/DL — HIGH (ref 70–99)
GLUCOSE BLDC GLUCOMTR-MCNC: 149 MG/DL — HIGH (ref 70–99)
GLUCOSE SERPL-MCNC: 145 MG/DL — HIGH (ref 70–99)
HCT VFR BLD CALC: 31.6 % — LOW (ref 37–47)
HGB BLD-MCNC: 10.7 G/DL — LOW (ref 12–16)
LYMPHOCYTES # BLD AUTO: 0.48 K/UL — LOW (ref 1.2–3.4)
LYMPHOCYTES # BLD AUTO: 5.3 % — LOW (ref 20.5–51.1)
MAGNESIUM SERPL-MCNC: 1.6 MG/DL — LOW (ref 1.8–2.4)
MCHC RBC-ENTMCNC: 26.8 PG — LOW (ref 27–31)
MCHC RBC-ENTMCNC: 33.9 G/DL — SIGNIFICANT CHANGE UP (ref 32–37)
MCV RBC AUTO: 79 FL — LOW (ref 81–99)
MONOCYTES # BLD AUTO: 0.15 K/UL — SIGNIFICANT CHANGE UP (ref 0.1–0.6)
MONOCYTES NFR BLD AUTO: 1.7 % — SIGNIFICANT CHANGE UP (ref 1.7–9.3)
NEUTROPHILS # BLD AUTO: 8.37 K/UL — HIGH (ref 1.4–6.5)
NEUTROPHILS NFR BLD AUTO: 93 % — HIGH (ref 42.2–75.2)
PHOSPHATE SERPL-MCNC: 2.8 MG/DL — SIGNIFICANT CHANGE UP (ref 2.1–4.9)
PLATELET # BLD AUTO: 206 K/UL — SIGNIFICANT CHANGE UP (ref 130–400)
PMV BLD: 11.3 FL — HIGH (ref 7.4–10.4)
POTASSIUM SERPL-MCNC: 4.4 MMOL/L — SIGNIFICANT CHANGE UP (ref 3.5–5)
POTASSIUM SERPL-SCNC: 4.4 MMOL/L — SIGNIFICANT CHANGE UP (ref 3.5–5)
RBC # BLD: 4 M/UL — LOW (ref 4.2–5.4)
RBC # FLD: 12.9 % — SIGNIFICANT CHANGE UP (ref 11.5–14.5)
SODIUM SERPL-SCNC: 138 MMOL/L — SIGNIFICANT CHANGE UP (ref 135–146)
WBC # BLD: 9 K/UL — SIGNIFICANT CHANGE UP (ref 4.8–10.8)
WBC # FLD AUTO: 9 K/UL — SIGNIFICANT CHANGE UP (ref 4.8–10.8)

## 2024-05-20 PROCEDURE — 43644 LAP GASTRIC BYPASS/ROUX-EN-Y: CPT

## 2024-05-20 PROCEDURE — 88307 TISSUE EXAM BY PATHOLOGIST: CPT

## 2024-05-20 PROCEDURE — 85025 COMPLETE CBC W/AUTO DIFF WBC: CPT

## 2024-05-20 PROCEDURE — 80048 BASIC METABOLIC PNL TOTAL CA: CPT

## 2024-05-20 PROCEDURE — 88307 TISSUE EXAM BY PATHOLOGIST: CPT | Mod: 26

## 2024-05-20 PROCEDURE — 83735 ASSAY OF MAGNESIUM: CPT

## 2024-05-20 PROCEDURE — S2900: CPT

## 2024-05-20 PROCEDURE — S2900 ROBOTIC SURGICAL SYSTEM: CPT | Mod: NC

## 2024-05-20 PROCEDURE — 36415 COLL VENOUS BLD VENIPUNCTURE: CPT

## 2024-05-20 PROCEDURE — 82962 GLUCOSE BLOOD TEST: CPT

## 2024-05-20 PROCEDURE — C9113: CPT

## 2024-05-20 PROCEDURE — C1889: CPT

## 2024-05-20 PROCEDURE — 84100 ASSAY OF PHOSPHORUS: CPT

## 2024-05-20 RX ORDER — HYOSCYAMINE SULFATE 0.13 MG
0.12 TABLET ORAL EVERY 4 HOURS
Refills: 0 | Status: DISCONTINUED | OUTPATIENT
Start: 2024-05-20 | End: 2024-05-21

## 2024-05-20 RX ORDER — ACETAMINOPHEN 500 MG
650 TABLET ORAL EVERY 6 HOURS
Refills: 0 | Status: DISCONTINUED | OUTPATIENT
Start: 2024-05-20 | End: 2024-05-21

## 2024-05-20 RX ORDER — APREPITANT 80 MG/1
40 CAPSULE ORAL ONCE
Refills: 0 | Status: COMPLETED | OUTPATIENT
Start: 2024-05-20 | End: 2024-05-20

## 2024-05-20 RX ORDER — ACETAMINOPHEN 500 MG
1000 TABLET ORAL ONCE
Refills: 0 | Status: COMPLETED | OUTPATIENT
Start: 2024-05-20 | End: 2024-05-20

## 2024-05-20 RX ORDER — ENOXAPARIN SODIUM 100 MG/ML
40 INJECTION SUBCUTANEOUS EVERY 24 HOURS
Refills: 0 | Status: DISCONTINUED | OUTPATIENT
Start: 2024-05-20 | End: 2024-05-21

## 2024-05-20 RX ORDER — OXYCODONE HYDROCHLORIDE 5 MG/1
5 TABLET ORAL ONCE
Refills: 0 | Status: DISCONTINUED | OUTPATIENT
Start: 2024-05-20 | End: 2024-05-20

## 2024-05-20 RX ORDER — SERTRALINE 25 MG/1
1 TABLET, FILM COATED ORAL
Refills: 0 | DISCHARGE

## 2024-05-20 RX ORDER — ENOXAPARIN SODIUM 100 MG/ML
40 INJECTION SUBCUTANEOUS ONCE
Refills: 0 | Status: COMPLETED | OUTPATIENT
Start: 2024-05-20 | End: 2024-05-20

## 2024-05-20 RX ORDER — SODIUM CHLORIDE 9 MG/ML
1000 INJECTION, SOLUTION INTRAVENOUS
Refills: 0 | Status: DISCONTINUED | OUTPATIENT
Start: 2024-05-20 | End: 2024-05-21

## 2024-05-20 RX ORDER — ONDANSETRON 8 MG/1
4 TABLET, FILM COATED ORAL EVERY 6 HOURS
Refills: 0 | Status: DISCONTINUED | OUTPATIENT
Start: 2024-05-20 | End: 2024-05-21

## 2024-05-20 RX ORDER — MAGNESIUM SULFATE 500 MG/ML
2 VIAL (ML) INJECTION ONCE
Refills: 0 | Status: COMPLETED | OUTPATIENT
Start: 2024-05-20 | End: 2024-05-20

## 2024-05-20 RX ORDER — GABAPENTIN 400 MG/1
400 CAPSULE ORAL THREE TIMES A DAY
Refills: 0 | Status: DISCONTINUED | OUTPATIENT
Start: 2024-05-20 | End: 2024-05-21

## 2024-05-20 RX ORDER — PANTOPRAZOLE SODIUM 20 MG/1
40 TABLET, DELAYED RELEASE ORAL EVERY 24 HOURS
Refills: 0 | Status: DISCONTINUED | OUTPATIENT
Start: 2024-05-20 | End: 2024-05-21

## 2024-05-20 RX ADMIN — SODIUM CHLORIDE 100 MILLILITER(S): 9 INJECTION, SOLUTION INTRAVENOUS at 12:23

## 2024-05-20 RX ADMIN — Medication 25 GRAM(S): at 22:29

## 2024-05-20 RX ADMIN — ENOXAPARIN SODIUM 40 MILLIGRAM(S): 100 INJECTION SUBCUTANEOUS at 06:48

## 2024-05-20 RX ADMIN — Medication 63.75 MILLIMOLE(S): at 22:59

## 2024-05-20 RX ADMIN — Medication 400 MILLIGRAM(S): at 09:00

## 2024-05-20 RX ADMIN — GABAPENTIN 400 MILLIGRAM(S): 400 CAPSULE ORAL at 17:47

## 2024-05-20 RX ADMIN — SODIUM CHLORIDE 100 MILLILITER(S): 9 INJECTION, SOLUTION INTRAVENOUS at 17:46

## 2024-05-20 RX ADMIN — APREPITANT 40 MILLIGRAM(S): 80 CAPSULE ORAL at 06:48

## 2024-05-20 RX ADMIN — PANTOPRAZOLE SODIUM 40 MILLIGRAM(S): 20 TABLET, DELAYED RELEASE ORAL at 12:23

## 2024-05-20 RX ADMIN — Medication 650 MILLIGRAM(S): at 22:31

## 2024-05-20 RX ADMIN — Medication 650 MILLIGRAM(S): at 23:01

## 2024-05-20 RX ADMIN — Medication 400 MILLIGRAM(S): at 17:46

## 2024-05-20 RX ADMIN — Medication 1000 MILLIGRAM(S): at 12:00

## 2024-05-20 NOTE — PATIENT PROFILE ADULT - DOES PATIENT HAVE ADVANCE DIRECTIVE
SW attempted completion of the LOCET via phone for patient admission to SNF. SW was informed that agents not available to assist with completion of LOCET at that time. SW left contact information for f/u call.  CRISTINA will continue to follow.              JONAH Selby, LMSW  [facility]  Case Management  Ext. [phone]    No

## 2024-05-20 NOTE — PATIENT PROFILE ADULT - FALL HARM RISK - DEVICES
Head,  normocephalic,  atraumatic,  Face,  Face within normal limits,  Ears,  External ears within normal limits,
None

## 2024-05-20 NOTE — CHART NOTE - NSCHARTNOTEFT_GEN_A_CORE
Post Operative Note  Patient: SUE BRITO 33y (1990) Female   MRN: [id number]  Location: [address]  Visit: 05-20-24 Inpatient  Date: 05-20-24 @ 15:57    Procedure: S/P robot assisted laparoscopic sleeve gastrectomy converted to gastric bypass, EGD, B/L Tap block    Subjective:   Nausea: no, Vomiting: no, Ambulating: yes, Flatus: no  Pain Assessment: yes, Location: Epigastric region, Pain Intensity: 3 /10 , Quality:  Sore    Objective:  Vitals: T(F): 98.3 (05-20-24 @ 14:45), Max: 98.8 (05-20-24 @ 12:05)  HR: 80 (05-20-24 @ 14:45)  BP: 133/81 (05-20-24 @ 14:45) (117/69 - 144/83)  RR: 19 (05-20-24 @ 14:45)  SpO2: 97% (05-20-24 @ 14:45)  Vent Settings:     In:   05-20-24 @ 07:01  -  05-20-24 @ 15:57  --------------------------------------------------------  IN: 300 mL      IV Fluids: lactated ringers. 1000 milliLiter(s) (100 mL/Hr) IV Continuous <Continuous>      Out:   05-20-24 @ 07:01  -  05-20-24 @ 15:57  --------------------------------------------------------  OUT: 300 mL      EBL:     Voided Urine:   05-20-24 @ 07:01  -  05-20-24 @ 15:57  --------------------------------------------------------  OUT: 300 mL      Saravia Catheter: yes no   Drains:   LORETA:    ,   Chest Tube:      NG Tube:       Physical Examination:  General Appearance: NAD  HEENT: EOMI, sclera non-icteric.  Heart: RRR  Lungs: Equal chest rise B/L  Abdomen:  Soft, nontender, nondistended. No rigidity, guarding, or rebound tenderness. Laparoscopic incisions w/ dermabond, no evidence of bleeding or drainage  MSK/Extremities: Warm & well-perfused. Peripheral pulses intact.  Skin: Warm, dry. No jaundice.       Medications: [Standing]  acetaminophen   IVPB .. 1000 milliGRAM(s) IV Intermittent once  acetaminophen   Oral Liquid .. 650 milliGRAM(s) Oral every 6 hours  enoxaparin Injectable 40 milliGRAM(s) SubCutaneous every 24 hours  gabapentin Solution 400 milliGRAM(s) Oral three times a day  hyoscyamine SL 0.125 milliGRAM(s) SubLingual every 4 hours PRN  lactated ringers. 1000 milliLiter(s) IV Continuous <Continuous>  ondansetron Injectable 4 milliGRAM(s) IV Push every 6 hours PRN  pantoprazole  Injectable 40 milliGRAM(s) IV Push every 24 hours    Medications: [PRN]  acetaminophen   IVPB .. 1000 milliGRAM(s) IV Intermittent once  acetaminophen   Oral Liquid .. 650 milliGRAM(s) Oral every 6 hours  enoxaparin Injectable 40 milliGRAM(s) SubCutaneous every 24 hours  gabapentin Solution 400 milliGRAM(s) Oral three times a day  hyoscyamine SL 0.125 milliGRAM(s) SubLingual every 4 hours PRN  lactated ringers. 1000 milliLiter(s) IV Continuous <Continuous>  ondansetron Injectable 4 milliGRAM(s) IV Push every 6 hours PRN  pantoprazole  Injectable 40 milliGRAM(s) IV Push every 24 hours    Labs:                Imaging:  No post-op imaging studies    Assessment:  33yFemale patient S/P robot assisted laparoscopic sleeve gastrectomy converted to gastric bypass, EGD, B/L Tap block    Plan:  Arturo CLD, maintain IVF hydration  Strict I/Os - will continue to monitor UOP  Continue DVT ppx - lovenox, SCDs, ambulation  F/u 4pm Labs  Antiemetics/pain control PRN  Encouraged ambulation multiple times daily, patient instructed on IS use 10x/hr      Date/Time: 05-20-24 @ 15:57

## 2024-05-20 NOTE — PATIENT PROFILE ADULT - HEALTH LITERACY
A vaccine record for the shingles was received from [facility].  
Send to Юлия Mcgee for documentation. Thanks  
no

## 2024-05-20 NOTE — BRIEF OPERATIVE NOTE - NSICDXBRIEFPROCEDURE_GEN_ALL_CORE_FT
PROCEDURES:  Robot-assisted laparoscopic creation of gastric bypass 20-May-2024 11:20:00  Kaylin Flores

## 2024-05-20 NOTE — PATIENT PROFILE ADULT - NSPRESCRUSEDDRG_GEN_A_NUR
Telephone Encounter by Ani Kerns RN at 11/15/17 07:22 PM     Author:  Ani Kerns RN Service:  (none) Author Type:  Registered Nurse     Filed:  11/15/17 07:25 PM Encounter Date:  11/15/2017 Status:  Signed     :  Ani Kerns RN (Registered Nurse)            Routed to Dr Jensen for review.    Per epic she is on the following medication:  Coreg 25 mg bid  Furosemide 20 mg bid  lisinopril 20 mg bid    Please advise.[BN1.1M]      Revision History        User Key Date/Time User Provider Type Action    > BN1.1 11/15/17 07:25 PM Ani Kerns, RN Registered Nurse Sign    M - Manual             No

## 2024-05-20 NOTE — PATIENT PROFILE ADULT - FALL HARM RISK - HARM RISK INTERVENTIONS

## 2024-05-20 NOTE — BRIEF OPERATIVE NOTE - OPERATION/FINDINGS
Robotic creation of gastric bypass. Intraop EGD showed staple line intact, GJ anastomosis widely patent. Negative leak test.

## 2024-05-21 ENCOUNTER — TRANSCRIPTION ENCOUNTER (OUTPATIENT)
Age: 34
End: 2024-05-21

## 2024-05-21 VITALS
TEMPERATURE: 98 F | OXYGEN SATURATION: 97 % | DIASTOLIC BLOOD PRESSURE: 85 MMHG | HEART RATE: 69 BPM | SYSTOLIC BLOOD PRESSURE: 125 MMHG | RESPIRATION RATE: 18 BRPM

## 2024-05-21 LAB
ANION GAP SERPL CALC-SCNC: 7 MMOL/L — SIGNIFICANT CHANGE UP (ref 7–14)
BASOPHILS # BLD AUTO: 0.01 K/UL — SIGNIFICANT CHANGE UP (ref 0–0.2)
BASOPHILS NFR BLD AUTO: 0.1 % — SIGNIFICANT CHANGE UP (ref 0–1)
BUN SERPL-MCNC: 9 MG/DL — LOW (ref 10–20)
CALCIUM SERPL-MCNC: 8.7 MG/DL — SIGNIFICANT CHANGE UP (ref 8.4–10.5)
CHLORIDE SERPL-SCNC: 104 MMOL/L — SIGNIFICANT CHANGE UP (ref 98–110)
CO2 SERPL-SCNC: 26 MMOL/L — SIGNIFICANT CHANGE UP (ref 17–32)
CREAT SERPL-MCNC: 0.6 MG/DL — LOW (ref 0.7–1.5)
EGFR: 122 ML/MIN/1.73M2 — SIGNIFICANT CHANGE UP
EOSINOPHIL # BLD AUTO: 0.04 K/UL — SIGNIFICANT CHANGE UP (ref 0–0.7)
EOSINOPHIL NFR BLD AUTO: 0.5 % — SIGNIFICANT CHANGE UP (ref 0–8)
GLUCOSE BLDC GLUCOMTR-MCNC: 87 MG/DL — SIGNIFICANT CHANGE UP (ref 70–99)
GLUCOSE SERPL-MCNC: 117 MG/DL — HIGH (ref 70–99)
HCT VFR BLD CALC: 30.3 % — LOW (ref 37–47)
HGB BLD-MCNC: 10.1 G/DL — LOW (ref 12–16)
IMM GRANULOCYTES NFR BLD AUTO: 0.3 % — SIGNIFICANT CHANGE UP (ref 0.1–0.3)
LYMPHOCYTES # BLD AUTO: 0.89 K/UL — LOW (ref 1.2–3.4)
LYMPHOCYTES # BLD AUTO: 10.3 % — LOW (ref 20.5–51.1)
MAGNESIUM SERPL-MCNC: 1.8 MG/DL — SIGNIFICANT CHANGE UP (ref 1.8–2.4)
MCHC RBC-ENTMCNC: 26.6 PG — LOW (ref 27–31)
MCHC RBC-ENTMCNC: 33.3 G/DL — SIGNIFICANT CHANGE UP (ref 32–37)
MCV RBC AUTO: 79.7 FL — LOW (ref 81–99)
MONOCYTES # BLD AUTO: 0.78 K/UL — HIGH (ref 0.1–0.6)
MONOCYTES NFR BLD AUTO: 9.1 % — SIGNIFICANT CHANGE UP (ref 1.7–9.3)
NEUTROPHILS # BLD AUTO: 6.86 K/UL — HIGH (ref 1.4–6.5)
NEUTROPHILS NFR BLD AUTO: 79.7 % — HIGH (ref 42.2–75.2)
NRBC # BLD: 0 /100 WBCS — SIGNIFICANT CHANGE UP (ref 0–0)
PHOSPHATE SERPL-MCNC: 4.4 MG/DL — SIGNIFICANT CHANGE UP (ref 2.1–4.9)
PLATELET # BLD AUTO: 211 K/UL — SIGNIFICANT CHANGE UP (ref 130–400)
PMV BLD: 11.7 FL — HIGH (ref 7.4–10.4)
POTASSIUM SERPL-MCNC: 4.2 MMOL/L — SIGNIFICANT CHANGE UP (ref 3.5–5)
POTASSIUM SERPL-SCNC: 4.2 MMOL/L — SIGNIFICANT CHANGE UP (ref 3.5–5)
RBC # BLD: 3.8 M/UL — LOW (ref 4.2–5.4)
RBC # FLD: 13 % — SIGNIFICANT CHANGE UP (ref 11.5–14.5)
SODIUM SERPL-SCNC: 137 MMOL/L — SIGNIFICANT CHANGE UP (ref 135–146)
WBC # BLD: 8.61 K/UL — SIGNIFICANT CHANGE UP (ref 4.8–10.8)
WBC # FLD AUTO: 8.61 K/UL — SIGNIFICANT CHANGE UP (ref 4.8–10.8)

## 2024-05-21 RX ORDER — PANTOPRAZOLE SODIUM 20 MG/1
40 TABLET, DELAYED RELEASE ORAL
Qty: 0 | Refills: 0 | DISCHARGE
Start: 2024-05-21

## 2024-05-21 RX ORDER — ACETAMINOPHEN 500 MG
650 TABLET ORAL
Qty: 0 | Refills: 0 | DISCHARGE
Start: 2024-05-21

## 2024-05-21 RX ORDER — MAGNESIUM SULFATE 500 MG/ML
2 VIAL (ML) INJECTION ONCE
Refills: 0 | Status: COMPLETED | OUTPATIENT
Start: 2024-05-21 | End: 2024-05-21

## 2024-05-21 RX ADMIN — Medication 25 GRAM(S): at 05:58

## 2024-05-21 NOTE — DISCHARGE NOTE PROVIDER - CARE PROVIDER_API CALL
Shahrzad Baca  Surgery  [address] [place] [address], [address] [place] [address]  Phone: [phone]  Fax: [phone]  Established Patient  Follow Up Time: 1 week

## 2024-05-21 NOTE — DISCHARGE NOTE NURSING/CASE MANAGEMENT/SOCIAL WORK - PATIENT PORTAL LINK FT
You can access the FollowMyHealth Patient Portal offered by [facility] by registering at the following website: [email or web address]. By joining [facility]’s FollowMyHealth portal, you will also be able to view your health information using other applications (apps) compatible with our system.

## 2024-05-21 NOTE — PROGRESS NOTE ADULT - SUBJECTIVE AND OBJECTIVE BOX
GENERAL SURGERY PROGRESS NOTE     SUE BRITO  [address] [place] [address] day :2d    POD:1d  Procedure: Robot-assisted laparoscopic creation of gastric bypass      Surgical Attending: Shahrzad Baca  Overnight events: no acute events overnight. Pt reports tolerating her bariatric CLD without any nausea or vomiting. She denies any gas or BM at this time. Pt received 2g Mg and 15mmol Na Phos as repletions     T(F): 98.4 (05-20-24 @ 23:52), Max: 98.8 (05-20-24 @ 12:05)  HR: 80 (05-20-24 @ 23:52) (60 - 84)  BP: 120/75 (05-20-24 @ 23:52) (117/69 - 147/89)  ABP: --  ABP(mean): --  RR: 18 (05-20-24 @ 23:52) (15 - 19)  SpO2: 97% (05-20-24 @ 23:52) (96% - 100%)    IN'S / OUT's:    05-20-24 @ 07:01  -  05-21-24 @ 00:29  --------------------------------------------------------  IN:    Lactated Ringers: 500 mL    Oral Fluid: 330 mL  Total IN: 830 mL    OUT:    Voided (mL): 500 mL  Total OUT: 500 mL    Total NET: 330 mL          PHYSICAL EXAM:  GENERAL: NAD, well-appearing  CHEST/LUNG: equal chest rise b/l  HEART: Regular rate and rhythm  ABDOMEN: Soft, Nontender, Nondistended; laparoscopic incisions with dermabond, no evidence of bleeding or drainage  EXTREMITIES:  No clubbing, cyanosis, or edema      LABS  Labs:  CAPILLARY BLOOD GLUCOSE      POCT Blood Glucose.: 117 mg/dL (20 May 2024 23:58)  POCT Blood Glucose.: 149 mg/dL (20 May 2024 17:06)  POCT Blood Glucose.: 146 mg/dL (20 May 2024 12:15)                          10.7   9.00  )-----------( 206      ( 20 May 2024 20:06 )             31.6       Auto Neutrophil %: 93.0 % (05-20-24 @ 20:06)    05-20    138  |  104  |  11  ----------------------------<  145<H>  4.4   |  25  |  0.6<L>      Calcium: 8.9 mg/dL (05-20-24 @ 20:06)      LFTs:         Coags:            Urinalysis Basic - ( 20 May 2024 20:06 )    Color: x / Appearance: x / SG: x / pH: x  Gluc: 145 mg/dL / Ketone: x  / Bili: x / Urobili: x   Blood: x / Protein: x / Nitrite: x   Leuk Esterase: x / RBC: x / WBC x   Sq Epi: x / Non Sq Epi: x / Bacteria: x            RADIOLOGY & ADDITIONAL TESTS:      A/P:  SUE BRITO is a 33F w/ PMHx of GERD, endometriosis, s/p appendectomy, s/p ovarian cyctectomy, s/p liposuction, s/p gastric sleeve (2018), now s/p robot assisted laparoscopic gastric sleeve converted to gastric bypass, EGD and B/L TAP block      PLAN:   - continue with Bariatric CLD, advance as tolerated   - multi modal pain control  - hemodynamic monitoring   - f/u AM labs  - DVT and GI ppx  - encourage ambulation and IS as tolerated   - possible DC to home 5/21 am        #DVT ppx: enoxaparin Injectable 40 milliGRAM(s) SubCutaneous every 24 hours    #GI ppx: pantoprazole  Injectable 40 milliGRAM(s) IV Push every 24 hours    Disposition:  4C    Above plan to be discussed with Attending Surgeon Dr. Baca  , patient, patient family, and Primary team    [id number]   GENERAL SURGERY PROGRESS NOTE     SUE BRITO  [address] [place] [address] day :2d    POD:1d  Procedure: Robot-assisted laparoscopic creation of gastric bypass      Surgical Attending: Shahrzad Baca  Overnight events: no acute events overnight. Pt reports tolerating her bariatric CLD without any nausea or vomiting. She denies any gas or BM at this time. Pt received 2g Mg and 15mmol Na Phos as repletions     T(F): 98.4 (05-20-24 @ 23:52), Max: 98.8 (05-20-24 @ 12:05)  HR: 80 (05-20-24 @ 23:52) (60 - 84)  BP: 120/75 (05-20-24 @ 23:52) (117/69 - 147/89)  ABP: --  ABP(mean): --  RR: 18 (05-20-24 @ 23:52) (15 - 19)  SpO2: 97% (05-20-24 @ 23:52) (96% - 100%)    IN'S / OUT's:    05-20-24 @ 07:01  -  05-21-24 @ 00:29  --------------------------------------------------------  IN:    Lactated Ringers: 500 mL    Oral Fluid: 330 mL  Total IN: 830 mL    OUT:    Voided (mL): 500 mL  Total OUT: 500 mL    Total NET: 330 mL          PHYSICAL EXAM:  GENERAL: NAD, well-appearing  CHEST/LUNG: equal chest rise b/l  HEART: Regular rate and rhythm  ABDOMEN: Soft, Nontender, Nondistended; laparoscopic incisions with dermabond, no evidence of bleeding or drainage  EXTREMITIES:  No clubbing, cyanosis, or edema      LABS  Labs:  CAPILLARY BLOOD GLUCOSE      POCT Blood Glucose.: 117 mg/dL (20 May 2024 23:58)  POCT Blood Glucose.: 149 mg/dL (20 May 2024 17:06)  POCT Blood Glucose.: 146 mg/dL (20 May 2024 12:15)                          10.7   9.00  )-----------( 206      ( 20 May 2024 20:06 )             31.6       Auto Neutrophil %: 93.0 % (05-20-24 @ 20:06)    05-20    138  |  104  |  11  ----------------------------<  145<H>  4.4   |  25  |  0.6<L>      Calcium: 8.9 mg/dL (05-20-24 @ 20:06)      LFTs:         Coags:            Urinalysis Basic - ( 20 May 2024 20:06 )    Color: x / Appearance: x / SG: x / pH: x  Gluc: 145 mg/dL / Ketone: x  / Bili: x / Urobili: x   Blood: x / Protein: x / Nitrite: x   Leuk Esterase: x / RBC: x / WBC x   Sq Epi: x / Non Sq Epi: x / Bacteria: x            RADIOLOGY & ADDITIONAL TESTS:      A/P:  SUE BRITO is a 33F w/ PMHx of GERD, endometriosis, s/p appendectomy, s/p ovarian cyctectomy, s/p liposuction, s/p gastric sleeve (2018), now s/p robot assisted laparoscopic gastric sleeve converted to gastric bypass, EGD and B/L TAP block      PLAN:   - continue with Bariatric CLD, advance as tolerated   - multi modal pain control  - hemodynamic monitoring   - f/u AM labs  - DVT and GI ppx  - encourage ambulation and IS as tolerated   - possible DC to home 5/21 am        #DVT ppx: enoxaparin Injectable 40 milliGRAM(s) SubCutaneous every 24 hours    #GI ppx: pantoprazole  Injectable 40 milliGRAM(s) IV Push every 24 hours    Disposition:  4C    Above plan to be discussed with Attending Surgeon Dr. Baca  , patient, patient family, and rest of health care team  [facility] [id number]

## 2024-05-21 NOTE — DISCHARGE NOTE NURSING/CASE MANAGEMENT/SOCIAL WORK - NSDCPEFALRISK_GEN_ALL_CORE
For information on Fall & Injury Prevention, visit: [email or web address] OR  [email or web address] OR  https://www.cdc.gov/steadi/patient.html

## 2024-05-21 NOTE — DISCHARGE NOTE PROVIDER - NSDCMRMEDTOKEN_GEN_ALL_CORE_FT
Protonix 40 mg oral granule, delayed release: 40 milligram(s) orally once a day  sertraline 50 mg oral tablet: 1 tab(s) orally once a day  Tylenol 8 Hour 650 mg oral tablet, extended release: 650 milligram(s) orally every 6 hours

## 2024-05-21 NOTE — DISCHARGE NOTE PROVIDER - HOSPITAL COURSE
This 33YOF who underwent robotic-assisted conversion of sleeve gastrectomy to amish-en-y gastric bypass. Bariatric ERAS protocol followed to include preoperative and perioperative use of DVT, SSI prophylaxis as well as multi-modal non-opioid analgesics. Patient tolerated the procedure well, extubated in the operating room then transferred to the PACU in stable condition. Once hemodynamically stable and effective pain control the patient was able to ambulate with assistance. The patient was later transferred to a bariatric unit and placed on bedside continuous monitoring. Postoperative pain management included multi-modal non-opioid analgesics. The patient started tolerating bariatric clear liquid the evening of surgery.     On POD #1 patient remained stable with no acute events overnight, has effective pain control. Denies nausea and vomiting. Patient is ambulating independently and voiding as expected. The rest of the hospital course was uneventful, patient met 8-Point Bariatric Surgery D/C criteria and subsequently cleared for discharge home on POD#1.  No extended VTE prophylaxis required (calculated [facility] VTE Risk Stratification low). The patient will follow a protocol-derived staged meal progression supervised by the dietitian in the outpatient setting. Patient will follow-up with Dr. Baca in 7-10 days, medical doctor and dietitian in 30 days. All appropriate prescriptions obtained from prior to discharge. Written discharge instruction explained and given to include VTE prevention.

## 2024-05-21 NOTE — DISCHARGE NOTE PROVIDER - NSDCFUADDINST_GEN_ALL_CORE_FT
Activity: No heavy lifting > 10 lbs for 4 weeks. Avoid straining or excessive activity x 6 weeks. No swimming, soaking or baths for 2 weeks  Dressings: Do not scrub wounds. You may shower but do not bathe. Skin glue will fall off on its own in 1-2 weeks.  Pain control: You may take over-the-counter tylenol three times per day with food. If pain continues, may take prescription pain medications (celecoxib). May use ice packs or heat packs for pain and swelling.   Diet: Please continue bariatric liquid diet.  Follow up: Please call the number provided to confirm your appointment with Dr. Baca in 1 weeks. Please call with any questions or concerns including fevers, worsening pain, pus from the wounds, or redness of the skin.

## 2024-05-21 NOTE — DISCHARGE NOTE PROVIDER - NSDCCPTREATMENT_GEN_ALL_CORE_FT
PRINCIPAL PROCEDURE  Procedure: Conversion, gastrectomy, sleeve, to Ruthy-en-Y gastric bypass, robot-assisted, laparoscopic, using da Ivette Xi  Findings and Treatment:

## 2024-05-21 NOTE — DISCHARGE NOTE PROVIDER - NSDCCPCAREPLAN_GEN_ALL_CORE_FT
PRINCIPAL DISCHARGE DIAGNOSIS  Diagnosis: GERD (gastroesophageal reflux disease)  Assessment and Plan of Treatment:

## 2024-05-22 ENCOUNTER — NON-APPOINTMENT (OUTPATIENT)
Age: 34
End: 2024-05-22

## 2024-05-24 ENCOUNTER — RX CHANGE (OUTPATIENT)
Age: 34
End: 2024-05-24

## 2024-05-24 RX ORDER — OMEPRAZOLE 40 MG/1
40 CAPSULE, DELAYED RELEASE ORAL
Qty: 30 | Refills: 2 | Status: DISCONTINUED | COMMUNITY
Start: 2024-04-30 | End: 2024-05-24

## 2024-05-24 RX ORDER — OMEPRAZOLE 40 MG/1
40 CAPSULE, DELAYED RELEASE ORAL
Qty: 90 | Refills: 1 | Status: ACTIVE | COMMUNITY
Start: 1900-01-01 | End: 1900-01-01

## 2024-05-26 DIAGNOSIS — E66.9 OBESITY, UNSPECIFIED: ICD-10-CM

## 2024-05-26 DIAGNOSIS — Z90.49 ACQUIRED ABSENCE OF OTHER SPECIFIED PARTS OF DIGESTIVE TRACT: ICD-10-CM

## 2024-05-26 DIAGNOSIS — F41.9 ANXIETY DISORDER, UNSPECIFIED: ICD-10-CM

## 2024-05-26 DIAGNOSIS — K21.9 GASTRO-ESOPHAGEAL REFLUX DISEASE WITHOUT ESOPHAGITIS: ICD-10-CM

## 2024-05-26 DIAGNOSIS — Z98.84 BARIATRIC SURGERY STATUS: ICD-10-CM

## 2024-05-31 ENCOUNTER — APPOINTMENT (OUTPATIENT)
Dept: SURGERY | Facility: CLINIC | Age: 34
End: 2024-05-31
Payer: COMMERCIAL

## 2024-05-31 VITALS
BODY MASS INDEX: 33.05 KG/M2 | SYSTOLIC BLOOD PRESSURE: 110 MMHG | DIASTOLIC BLOOD PRESSURE: 60 MMHG | WEIGHT: 196 LBS | HEIGHT: 64.75 IN

## 2024-05-31 DIAGNOSIS — K21.9 GASTRO-ESOPHAGEAL REFLUX DISEASE W/OUT ESOPHAGITIS: ICD-10-CM

## 2024-05-31 PROCEDURE — 99024 POSTOP FOLLOW-UP VISIT: CPT

## 2024-06-04 PROBLEM — K21.9 CHRONIC GERD: Status: ACTIVE | Noted: 2023-11-04

## 2024-06-04 RX ORDER — CELECOXIB 200 MG/1
200 CAPSULE ORAL
Qty: 4 | Refills: 0 | Status: DISCONTINUED | COMMUNITY
Start: 2024-04-30 | End: 2024-06-04

## 2024-06-04 NOTE — PLAN
[FreeTextEntry1] :  RTO in 3 weeks Continue high protein diet and adequate fluid intake Encouraged exercise, no heavy lifting for 4 weeks Continue vitamins and PPI

## 2024-06-04 NOTE — PHYSICAL EXAM
[Normal] : affect appropriate [de-identified] : no distress  [de-identified] : nonlabored breathing  [de-identified] : nontender, soft, healing incisions c/d/i

## 2024-06-04 NOTE — ASSESSMENT
[FreeTextEntry1] : 34 yo female with s/p sleeve gastrectomy with intractable GERD s/p gastric bypass 5/20

## 2024-06-04 NOTE — HISTORY OF PRESENT ILLNESS
[de-identified] : 32 yo female s/p conversion oh a sleeve gastrectomy to a gastric bypass for intractable GERD. She is doing very well since surgery. Her GERD has completely resolved. She is tolerating her diet and has advanced to soft food. She does think that the lactose in the protein shakes is causing her to have nausea and tolerates the fairlife better. She is walking. She is taking her vitamins and PPI.

## 2024-06-05 LAB — SURGICAL PATHOLOGY STUDY: SIGNIFICANT CHANGE UP

## 2024-07-12 ENCOUNTER — APPOINTMENT (OUTPATIENT)
Dept: SURGERY | Facility: CLINIC | Age: 34
End: 2024-07-12

## 2024-07-12 ENCOUNTER — APPOINTMENT (OUTPATIENT)
Dept: SURGERY | Facility: CLINIC | Age: 34
End: 2024-07-12
Payer: COMMERCIAL

## 2024-07-12 VITALS
HEIGHT: 64.75 IN | TEMPERATURE: 96.8 F | OXYGEN SATURATION: 98 % | DIASTOLIC BLOOD PRESSURE: 84 MMHG | SYSTOLIC BLOOD PRESSURE: 124 MMHG | HEART RATE: 65 BPM | WEIGHT: 180 LBS | BODY MASS INDEX: 30.36 KG/M2

## 2024-07-12 PROCEDURE — 99024 POSTOP FOLLOW-UP VISIT: CPT

## 2024-08-18 NOTE — ED PROVIDER NOTE - ATTENDING WITH...
Please follow instructions on patient education material.      Return to urgent care in 2 to 3 days if symptoms are not improving, immediately if you develop any new or worsening symptoms.     -Get enough rest. Take naps if needed.  -Place a heating pad or hot water bottle on your belly or lower back. Use a heating pad for no more than 20 minutes at a time. Never go to sleep with a heating pad on as you may get burned.  -Take warm baths or showers.  -Do some kind of exercise each day as you can.  -Massage your lower back or belly. Use your fingertips and move in a [place].  -Try to relax and control stress. Take deep breaths, meditate, or do yoga.  -Wear loose clothes and underwear.    
ACP

## 2024-08-19 ENCOUNTER — APPOINTMENT (OUTPATIENT)
Dept: SURGERY | Facility: CLINIC | Age: 34
End: 2024-08-19

## 2024-08-23 ENCOUNTER — APPOINTMENT (OUTPATIENT)
Dept: SURGERY | Facility: CLINIC | Age: 34
End: 2024-08-23

## 2024-09-06 ENCOUNTER — APPOINTMENT (OUTPATIENT)
Dept: SURGERY | Facility: CLINIC | Age: 34
End: 2024-09-06

## 2024-09-06 VITALS
HEIGHT: 64.75 IN | SYSTOLIC BLOOD PRESSURE: 110 MMHG | WEIGHT: 186 LBS | DIASTOLIC BLOOD PRESSURE: 70 MMHG | BODY MASS INDEX: 31.37 KG/M2

## 2024-09-06 DIAGNOSIS — K21.9 GASTRO-ESOPHAGEAL REFLUX DISEASE W/OUT ESOPHAGITIS: ICD-10-CM

## 2024-09-06 PROCEDURE — 99213 OFFICE O/P EST LOW 20 MIN: CPT

## 2024-09-07 NOTE — PHYSICAL EXAM
[Normal] : affect appropriate [de-identified] : no distress  [de-identified] : nonlabored breathing  [de-identified] : nontender, soft

## 2024-09-07 NOTE — PLAN
[FreeTextEntry1] :  RTO in 3 months Continue high protein diet and adequate fluid intake Continue vitamins F/u labs done recently at [facility]

## 2024-09-07 NOTE — ASSESSMENT
[FreeTextEntry1] : 32 yo female with s/p sleeve gastrectomy with intractable GERD s/p gastric bypass 5/20

## 2024-09-07 NOTE — HISTORY OF PRESENT ILLNESS
[de-identified] : 32 yo female s/p conversion oh a sleeve gastrectomy to a gastric bypass for intractable GERD. She is doing very well since surgery. Her GERD has completely resolved. She is tolerating her diet. She stopped her PPI and still has no more GERD. She is taking her vitamins.  Her only issue currently is constipation, which she had prior to her bypass. She has tried Miralax, and it does not help, but she hasn't consistently taken any other medications.

## 2024-10-03 ENCOUNTER — NON-APPOINTMENT (OUTPATIENT)
Age: 34
End: 2024-10-03

## 2024-10-15 DIAGNOSIS — K29.00 ACUTE GASTRITIS W/OUT BLEEDING: ICD-10-CM

## 2024-10-15 RX ORDER — SUCRALFATE 1 G/1
1 TABLET ORAL 4 TIMES DAILY
Qty: 40 | Refills: 0 | Status: ACTIVE | COMMUNITY
Start: 2024-10-15 | End: 1900-01-01

## 2024-10-15 RX ORDER — OMEPRAZOLE 40 MG/1
40 CAPSULE, DELAYED RELEASE ORAL
Qty: 90 | Refills: 3 | Status: ACTIVE | COMMUNITY
Start: 2024-10-15 | End: 1900-01-01

## 2024-11-02 NOTE — ASU PREOP CHECKLIST - 1.
[Interpreters_IDNumber] : [id number] [Interpreters_FullName] : Duncan [TWNoteComboBox1] : [place] Received patient in pre-op. Patient A/Ox4. Denies pain/discomfort. Safety precautions maintained. All questions and concerns were addressed. no

## 2024-12-04 ENCOUNTER — APPOINTMENT (OUTPATIENT)
Dept: SURGERY | Facility: CLINIC | Age: 34
End: 2024-12-04
Payer: COMMERCIAL

## 2024-12-04 VITALS
DIASTOLIC BLOOD PRESSURE: 80 MMHG | TEMPERATURE: 97.6 F | WEIGHT: 161 LBS | HEIGHT: 64.75 IN | SYSTOLIC BLOOD PRESSURE: 142 MMHG | BODY MASS INDEX: 27.15 KG/M2

## 2024-12-04 PROCEDURE — 99214 OFFICE O/P EST MOD 30 MIN: CPT

## 2025-01-28 ENCOUNTER — APPOINTMENT (OUTPATIENT)
Dept: OBGYN | Facility: CLINIC | Age: 35
End: 2025-01-28
Payer: COMMERCIAL

## 2025-01-28 VITALS — WEIGHT: 155 LBS | BODY MASS INDEX: 25.83 KG/M2 | HEIGHT: 65 IN

## 2025-01-28 DIAGNOSIS — N76.0 ACUTE VAGINITIS: ICD-10-CM

## 2025-01-28 DIAGNOSIS — Z01.419 ENCOUNTER FOR GYNECOLOGICAL EXAMINATION (GENERAL) (ROUTINE) W/OUT ABNORMAL FINDINGS: ICD-10-CM

## 2025-01-28 PROCEDURE — 99395 PREV VISIT EST AGE 18-39: CPT

## 2025-01-28 RX ORDER — METRONIDAZOLE 7.5 MG/G
0.75 GEL VAGINAL
Qty: 1 | Refills: 0 | Status: ACTIVE | COMMUNITY
Start: 2025-01-28 | End: 1900-01-01

## 2025-01-30 LAB
BV BACTERIA RRNA VAG QL NAA+PROBE: NOT DETECTED
C GLABRATA RNA VAG QL NAA+PROBE: NOT DETECTED
C TRACH RRNA SPEC QL NAA+PROBE: NOT DETECTED
CANDIDA RRNA VAG QL PROBE: NOT DETECTED
HPV HIGH+LOW RISK DNA PNL CVX: NOT DETECTED
N GONORRHOEA RRNA SPEC QL NAA+PROBE: NOT DETECTED
T VAGINALIS RRNA SPEC QL NAA+PROBE: NOT DETECTED

## 2025-02-05 LAB — CYTOLOGY CVX/VAG DOC THIN PREP: NORMAL

## 2025-03-20 ENCOUNTER — APPOINTMENT (OUTPATIENT)
Dept: OBGYN | Facility: CLINIC | Age: 35
End: 2025-03-20
Payer: COMMERCIAL

## 2025-03-20 DIAGNOSIS — Z30.430 ENCOUNTER FOR INSERTION OF INTRAUTERINE CONTRACEPTIVE DEVICE: ICD-10-CM

## 2025-03-20 LAB
HCG UR QL: NEGATIVE
QUALITY CONTROL: YES

## 2025-03-20 PROCEDURE — 58300 INSERT INTRAUTERINE DEVICE: CPT

## 2025-03-20 PROCEDURE — 81025 URINE PREGNANCY TEST: CPT

## 2025-03-20 PROCEDURE — 99213 OFFICE O/P EST LOW 20 MIN: CPT | Mod: 25

## 2025-03-28 ENCOUNTER — APPOINTMENT (OUTPATIENT)
Dept: SURGERY | Facility: CLINIC | Age: 35
End: 2025-03-28
Payer: COMMERCIAL

## 2025-03-28 VITALS
DIASTOLIC BLOOD PRESSURE: 80 MMHG | WEIGHT: 156 LBS | HEART RATE: 82 BPM | BODY MASS INDEX: 25.99 KG/M2 | SYSTOLIC BLOOD PRESSURE: 120 MMHG | OXYGEN SATURATION: 98 % | HEIGHT: 65 IN

## 2025-03-28 DIAGNOSIS — K21.9 GASTRO-ESOPHAGEAL REFLUX DISEASE W/OUT ESOPHAGITIS: ICD-10-CM

## 2025-03-28 DIAGNOSIS — D50.8 OTHER IRON DEFICIENCY ANEMIAS: ICD-10-CM

## 2025-03-28 DIAGNOSIS — E51.9 THIAMINE DEFICIENCY, UNSPECIFIED: ICD-10-CM

## 2025-03-28 PROCEDURE — 99214 OFFICE O/P EST MOD 30 MIN: CPT

## 2025-03-28 RX ORDER — OMEPRAZOLE 40 MG/1
40 CAPSULE, DELAYED RELEASE ORAL
Qty: 90 | Refills: 3 | Status: ACTIVE | COMMUNITY
Start: 2025-03-28 | End: 1900-01-01

## 2025-03-28 RX ORDER — THIAMINE HCL 50 MG
50 TABLET ORAL
Qty: 30 | Refills: 1 | Status: ACTIVE | COMMUNITY
Start: 2025-03-28 | End: 1900-01-01

## 2025-03-28 RX ORDER — FERROUS GLUCONATE 324(38)MG
324 (38 FE) TABLET ORAL DAILY
Qty: 30 | Refills: 1 | Status: ACTIVE | COMMUNITY
Start: 2025-03-28 | End: 1900-01-01

## 2025-03-28 RX ORDER — SUCRALFATE 1 G/1
1 TABLET ORAL 4 TIMES DAILY
Qty: 120 | Refills: 2 | Status: ACTIVE | COMMUNITY
Start: 2025-03-28 | End: 1900-01-01

## 2025-05-09 ENCOUNTER — APPOINTMENT (OUTPATIENT)
Dept: OBGYN | Facility: CLINIC | Age: 35
End: 2025-05-09
Payer: COMMERCIAL

## 2025-05-09 VITALS
WEIGHT: 156 LBS | SYSTOLIC BLOOD PRESSURE: 147 MMHG | DIASTOLIC BLOOD PRESSURE: 87 MMHG | HEIGHT: 65 IN | BODY MASS INDEX: 25.99 KG/M2

## 2025-05-09 DIAGNOSIS — Z30.431 ENCOUNTER FOR ROUTINE CHECKING OF INTRAUTERINE CONTRACEPTIVE DEVICE: ICD-10-CM

## 2025-05-09 DIAGNOSIS — R10.2 PELVIC AND PERINEAL PAIN: ICD-10-CM

## 2025-05-09 PROCEDURE — 76830 TRANSVAGINAL US NON-OB: CPT

## 2025-05-09 PROCEDURE — 99213 OFFICE O/P EST LOW 20 MIN: CPT | Mod: 25

## 2025-06-20 ENCOUNTER — APPOINTMENT (OUTPATIENT)
Dept: SURGERY | Facility: CLINIC | Age: 35
End: 2025-06-20

## 2025-06-27 ENCOUNTER — APPOINTMENT (OUTPATIENT)
Dept: SURGERY | Facility: CLINIC | Age: 35
End: 2025-06-27

## 2025-07-25 ENCOUNTER — APPOINTMENT (OUTPATIENT)
Dept: SURGERY | Facility: CLINIC | Age: 35
End: 2025-07-25
Payer: COMMERCIAL

## 2025-07-25 VITALS
HEART RATE: 71 BPM | SYSTOLIC BLOOD PRESSURE: 122 MMHG | HEIGHT: 65 IN | BODY MASS INDEX: 25.66 KG/M2 | WEIGHT: 154 LBS | OXYGEN SATURATION: 99 % | DIASTOLIC BLOOD PRESSURE: 80 MMHG

## 2025-07-25 DIAGNOSIS — K21.9 GASTRO-ESOPHAGEAL REFLUX DISEASE W/OUT ESOPHAGITIS: ICD-10-CM

## 2025-07-25 PROCEDURE — 99213 OFFICE O/P EST LOW 20 MIN: CPT
